# Patient Record
Sex: MALE | Race: BLACK OR AFRICAN AMERICAN | NOT HISPANIC OR LATINO | Employment: OTHER | URBAN - METROPOLITAN AREA
[De-identification: names, ages, dates, MRNs, and addresses within clinical notes are randomized per-mention and may not be internally consistent; named-entity substitution may affect disease eponyms.]

---

## 2019-11-12 ENCOUNTER — HOSPITAL ENCOUNTER (EMERGENCY)
Facility: HOSPITAL | Age: 54
Discharge: HOME | End: 2019-11-12
Attending: EMERGENCY MEDICINE
Payer: MEDICAID

## 2019-11-12 VITALS
TEMPERATURE: 99 F | OXYGEN SATURATION: 99 % | WEIGHT: 175 LBS | HEIGHT: 72 IN | BODY MASS INDEX: 23.7 KG/M2 | DIASTOLIC BLOOD PRESSURE: 64 MMHG | RESPIRATION RATE: 18 BRPM | HEART RATE: 69 BPM | SYSTOLIC BLOOD PRESSURE: 133 MMHG

## 2019-11-12 DIAGNOSIS — N39.0 URINARY TRACT INFECTION WITHOUT HEMATURIA, SITE UNSPECIFIED: Primary | ICD-10-CM

## 2019-11-12 DIAGNOSIS — E87.6 HYPOKALEMIA: ICD-10-CM

## 2019-11-12 DIAGNOSIS — R68.83 CHILLS: ICD-10-CM

## 2019-11-12 LAB
ALBUMIN SERPL BCP-MCNC: 3.3 G/DL (ref 3.5–5.2)
ALP SERPL-CCNC: 42 U/L (ref 55–135)
ALT SERPL W/O P-5'-P-CCNC: 19 U/L (ref 10–44)
ANION GAP SERPL CALC-SCNC: 12 MMOL/L (ref 8–16)
AST SERPL-CCNC: 14 U/L (ref 10–40)
BACTERIA #/AREA URNS HPF: ABNORMAL /HPF
BASOPHILS # BLD AUTO: 0.05 K/UL (ref 0–0.2)
BASOPHILS NFR BLD: 0.3 % (ref 0–1.9)
BILIRUB SERPL-MCNC: 1.4 MG/DL (ref 0.1–1)
BILIRUB UR QL STRIP: NEGATIVE
BUN SERPL-MCNC: 23 MG/DL (ref 6–20)
CALCIUM SERPL-MCNC: 8.9 MG/DL (ref 8.7–10.5)
CHLORIDE SERPL-SCNC: 97 MMOL/L (ref 95–110)
CLARITY UR: ABNORMAL
CO2 SERPL-SCNC: 29 MMOL/L (ref 23–29)
COLOR UR: YELLOW
CREAT SERPL-MCNC: 1.1 MG/DL (ref 0.5–1.4)
DIFFERENTIAL METHOD: ABNORMAL
EOSINOPHIL # BLD AUTO: 0 K/UL (ref 0–0.5)
EOSINOPHIL NFR BLD: 0.3 % (ref 0–8)
ERYTHROCYTE [DISTWIDTH] IN BLOOD BY AUTOMATED COUNT: 14.1 % (ref 11.5–14.5)
EST. GFR  (AFRICAN AMERICAN): >60 ML/MIN/1.73 M^2
EST. GFR  (NON AFRICAN AMERICAN): >60 ML/MIN/1.73 M^2
GLUCOSE SERPL-MCNC: 116 MG/DL (ref 70–110)
GLUCOSE UR QL STRIP: NEGATIVE
HCT VFR BLD AUTO: 34.8 % (ref 40–54)
HGB BLD-MCNC: 11.8 G/DL (ref 14–18)
HGB UR QL STRIP: ABNORMAL
HYALINE CASTS #/AREA URNS LPF: 58 /LPF
IMM GRANULOCYTES # BLD AUTO: 0.11 K/UL (ref 0–0.04)
IMM GRANULOCYTES NFR BLD AUTO: 0.7 % (ref 0–0.5)
KETONES UR QL STRIP: ABNORMAL
LEUKOCYTE ESTERASE UR QL STRIP: ABNORMAL
LYMPHOCYTES # BLD AUTO: 1.8 K/UL (ref 1–4.8)
LYMPHOCYTES NFR BLD: 12.3 % (ref 18–48)
MCH RBC QN AUTO: 30.9 PG (ref 27–31)
MCHC RBC AUTO-ENTMCNC: 33.9 G/DL (ref 32–36)
MCV RBC AUTO: 91 FL (ref 82–98)
MICROSCOPIC COMMENT: ABNORMAL
MONOCYTES # BLD AUTO: 1.5 K/UL (ref 0.3–1)
MONOCYTES NFR BLD: 10.2 % (ref 4–15)
NEUTROPHILS # BLD AUTO: 11.2 K/UL (ref 1.8–7.7)
NEUTROPHILS NFR BLD: 76.2 % (ref 38–73)
NITRITE UR QL STRIP: NEGATIVE
NRBC BLD-RTO: 0 /100 WBC
PH UR STRIP: 6 [PH] (ref 5–8)
PLATELET # BLD AUTO: 407 K/UL (ref 150–350)
PMV BLD AUTO: 10.4 FL (ref 9.2–12.9)
POTASSIUM SERPL-SCNC: 2.9 MMOL/L (ref 3.5–5.1)
PROT SERPL-MCNC: 8.4 G/DL (ref 6–8.4)
PROT UR QL STRIP: ABNORMAL
RBC # BLD AUTO: 3.82 M/UL (ref 4.6–6.2)
RBC #/AREA URNS HPF: 9 /HPF (ref 0–4)
SODIUM SERPL-SCNC: 138 MMOL/L (ref 136–145)
SP GR UR STRIP: 1.01 (ref 1–1.03)
SQUAMOUS #/AREA URNS HPF: 0 /HPF
URN SPEC COLLECT METH UR: ABNORMAL
UROBILINOGEN UR STRIP-ACNC: ABNORMAL EU/DL
WBC # BLD AUTO: 14.67 K/UL (ref 3.9–12.7)
WBC #/AREA URNS HPF: >100 /HPF (ref 0–5)

## 2019-11-12 PROCEDURE — 96375 TX/PRO/DX INJ NEW DRUG ADDON: CPT

## 2019-11-12 PROCEDURE — 93005 ELECTROCARDIOGRAM TRACING: CPT

## 2019-11-12 PROCEDURE — 81001 URINALYSIS AUTO W/SCOPE: CPT

## 2019-11-12 PROCEDURE — 96374 THER/PROPH/DIAG INJ IV PUSH: CPT

## 2019-11-12 PROCEDURE — 87086 URINE CULTURE/COLONY COUNT: CPT

## 2019-11-12 PROCEDURE — 25000003 PHARM REV CODE 250: Performed by: EMERGENCY MEDICINE

## 2019-11-12 PROCEDURE — 85025 COMPLETE CBC W/AUTO DIFF WBC: CPT

## 2019-11-12 PROCEDURE — 63600175 PHARM REV CODE 636 W HCPCS: Performed by: EMERGENCY MEDICINE

## 2019-11-12 PROCEDURE — 87186 SC STD MICRODIL/AGAR DIL: CPT

## 2019-11-12 PROCEDURE — 80053 COMPREHEN METABOLIC PANEL: CPT

## 2019-11-12 PROCEDURE — 36415 COLL VENOUS BLD VENIPUNCTURE: CPT

## 2019-11-12 PROCEDURE — 99285 EMERGENCY DEPT VISIT HI MDM: CPT | Mod: 25

## 2019-11-12 PROCEDURE — 87077 CULTURE AEROBIC IDENTIFY: CPT

## 2019-11-12 RX ORDER — IBUPROFEN 800 MG/1
800 TABLET ORAL 2 TIMES DAILY PRN
COMMUNITY
End: 2021-04-29 | Stop reason: SDUPTHER

## 2019-11-12 RX ORDER — POTASSIUM CHLORIDE 750 MG/1
40 TABLET, EXTENDED RELEASE ORAL DAILY
Qty: 10 TABLET | Refills: 0 | Status: SHIPPED | OUTPATIENT
Start: 2019-11-12 | End: 2021-04-29

## 2019-11-12 RX ORDER — CIPROFLOXACIN 500 MG/1
500 TABLET ORAL 2 TIMES DAILY
Qty: 20 TABLET | Refills: 0 | Status: SHIPPED | OUTPATIENT
Start: 2019-11-12 | End: 2019-11-22

## 2019-11-12 RX ORDER — CEFTRIAXONE 1 G/1
1 INJECTION, POWDER, FOR SOLUTION INTRAMUSCULAR; INTRAVENOUS
Status: DISCONTINUED | OUTPATIENT
Start: 2019-11-12 | End: 2019-11-12

## 2019-11-12 RX ORDER — CIPROFLOXACIN 500 MG/1
500 TABLET ORAL 2 TIMES DAILY
Qty: 20 TABLET | Refills: 0 | Status: SHIPPED | OUTPATIENT
Start: 2019-11-12 | End: 2019-11-12 | Stop reason: SDUPTHER

## 2019-11-12 RX ORDER — CEFTRIAXONE 1 G/1
1 INJECTION, POWDER, FOR SOLUTION INTRAMUSCULAR; INTRAVENOUS
Status: COMPLETED | OUTPATIENT
Start: 2019-11-12 | End: 2019-11-12

## 2019-11-12 RX ORDER — SIMVASTATIN 40 MG/1
40 TABLET, FILM COATED ORAL NIGHTLY
COMMUNITY
End: 2021-03-29

## 2019-11-12 RX ORDER — HYDROCHLOROTHIAZIDE 25 MG/1
25 TABLET ORAL DAILY
COMMUNITY
End: 2021-01-04

## 2019-11-12 RX ORDER — ONDANSETRON 2 MG/ML
4 INJECTION INTRAMUSCULAR; INTRAVENOUS
Status: COMPLETED | OUTPATIENT
Start: 2019-11-12 | End: 2019-11-12

## 2019-11-12 RX ORDER — ONDANSETRON 4 MG/1
4 TABLET, FILM COATED ORAL EVERY 6 HOURS
Qty: 12 TABLET | Refills: 0 | Status: SHIPPED | OUTPATIENT
Start: 2019-11-12 | End: 2019-11-12 | Stop reason: SDUPTHER

## 2019-11-12 RX ORDER — POTASSIUM CHLORIDE 20 MEQ/15ML
50 SOLUTION ORAL ONCE
Status: COMPLETED | OUTPATIENT
Start: 2019-11-12 | End: 2019-11-12

## 2019-11-12 RX ORDER — ONDANSETRON 4 MG/1
4 TABLET, FILM COATED ORAL EVERY 6 HOURS
Qty: 12 TABLET | Refills: 0 | Status: SHIPPED | OUTPATIENT
Start: 2019-11-12 | End: 2021-04-29

## 2019-11-12 RX ORDER — BACLOFEN 10 MG/1
10 TABLET ORAL 2 TIMES DAILY
COMMUNITY
End: 2021-01-04

## 2019-11-12 RX ADMIN — CEFTRIAXONE SODIUM 1 G: 1 INJECTION, POWDER, FOR SOLUTION INTRAMUSCULAR; INTRAVENOUS at 04:11

## 2019-11-12 RX ADMIN — POTASSIUM BICARBONATE 50 MEQ: 25 TABLET, EFFERVESCENT ORAL at 02:11

## 2019-11-12 RX ADMIN — ONDANSETRON 4 MG: 2 INJECTION INTRAMUSCULAR; INTRAVENOUS at 03:11

## 2019-11-12 RX ADMIN — POTASSIUM CHLORIDE 50 MEQ: 20 SOLUTION ORAL at 04:11

## 2019-11-12 NOTE — ED NOTES
Pt states he has the chills. He doesn't know if he had a fever. Also states he feels dehydrated. He has MS.

## 2019-11-12 NOTE — DISCHARGE INSTRUCTIONS
ContinueRoutine medication.  Encourage oral fluids.  Take antibiotics, Cipro, Zofran for nausea and and Zofran for nausea.  Watch for any fever over 100.8° and if any occurs return to the hospital.  Watch for any vomiting and if that persists despite taking Zofran, return to the hospital.  See her primary physician in 7-10 days.

## 2019-11-12 NOTE — ED PROVIDER NOTES
Encounter Date: 11/12/2019       History     Chief Complaint   Patient presents with    Chills     X 1 WEEK     54-year-old male who has a history of hypertension hyperlipidemia and multiple sclerosis, is brought to the emergency room by friends with history the patient has had subjective chills of 1 week duration.  No documented fever.  No sweats. Patient admits to some rhinitis but no complaint of any facial pain or sore throat. No headache. He does admit to some urinary frequency but denies any dysuria hematuria.  He has had some nausea and he states dry heaves but no actual vomiting or diarrhea.  The patient admits some urinary frequency but no complaints of any dysuria.        Review of patient's allergies indicates:  No Known Allergies  Past Medical History:   Diagnosis Date    Hyperlipidemia     Hypertension     Multiple sclerosis      No past surgical history on file.  No family history on file.  Social History     Tobacco Use    Smoking status: Not on file   Substance Use Topics    Alcohol use: Not on file    Drug use: Not on file     Review of Systems   Constitutional: Positive for chills. Negative for activity change, appetite change, diaphoresis and fever.   HENT: Positive for rhinorrhea. Negative for congestion, ear pain, sinus pain and sore throat.    Eyes: Negative for pain.   Respiratory: Negative for cough, chest tightness, shortness of breath, wheezing and stridor.    Cardiovascular: Negative for chest pain.   Gastrointestinal: Negative for abdominal pain, constipation, diarrhea, nausea and vomiting.   Genitourinary: Positive for frequency. Negative for dysuria, flank pain and hematuria.   Skin: Negative for pallor, rash and wound.   Neurological: Negative for syncope and headaches.   All other systems reviewed and are negative.      Physical Exam     Initial Vitals [11/12/19 1232]   BP Pulse Resp Temp SpO2   139/64 70 18 98.7 °F (37.1 °C) 100 %      MAP       --         Physical  Exam    Constitutional: He appears well-developed and well-nourished. He is not diaphoretic. No distress.   HENT:   Head: Normocephalic and atraumatic.   Nose: Nose normal.   Mouth/Throat: Oropharynx is clear and moist.   Rhinorrhea   Eyes: Conjunctivae and EOM are normal. Pupils are equal, round, and reactive to light. Right eye exhibits no discharge. Left eye exhibits no discharge. No scleral icterus.   Neck: Normal range of motion. Neck supple. No JVD present.   Cardiovascular: Normal rate, regular rhythm, normal heart sounds and intact distal pulses. Exam reveals no gallop and no friction rub.    No murmur heard.  Pulmonary/Chest: Breath sounds normal. No respiratory distress. He has no wheezes. He has no rhonchi. He has no rales.   Abdominal: Soft. Bowel sounds are normal. He exhibits no distension. There is no tenderness. There is no rebound and no guarding.   Musculoskeletal: Normal range of motion. He exhibits no edema or tenderness.   Lymphadenopathy:     He has no cervical adenopathy.   Neurological: He is alert and oriented to person, place, and time. He has normal strength. No cranial nerve deficit or sensory deficit. GCS score is 15. GCS eye subscore is 4. GCS verbal subscore is 5. GCS motor subscore is 6.   Skin: Skin is warm and dry. Capillary refill takes less than 2 seconds. No rash noted. No erythema. No pallor.   Psychiatric: He has a normal mood and affect. His behavior is normal. Judgment and thought content normal.         ED Course   Procedures  Labs Reviewed   CBC W/ AUTO DIFFERENTIAL   COMPREHENSIVE METABOLIC PANEL   URINALYSIS, REFLEX TO URINE CULTURE          Imaging Results    None                       Attending Attestation:             Attending ED Notes:   Diagnostic studies showed evidence of urinary tract infection.  CBC has an elevated white blood cell count.  All labs were reviewed.  During the ED course the patient was also noted to have had a potassium at 2.9 for which he  received 50 mEq of potassium orally the patient had an EKG showing a sinus bradycardia.  The patient will be given IV Rocephin and continued on antibiotics as an outpatient.  Potassium will also be continued in light of the fact he is on hydrochlorothiazide and that will predispose him to becoming hypokalemic.  Lastly he will also receive Zofran.  He is advised to check his temperature with a thermometer any for some reason he runs fever or has any worsening symptoms, he is to return to the emergency room.  The patient is to see his primary physician within the next 7-10 days.                        Clinical Impression:       ICD-10-CM ICD-9-CM   1. Urinary tract infection without hematuria, site unspecified N39.0 599.0   2. Chills R68.83 780.64   3. Hypokalemia E87.6 276.8                             Jonathan Camarillo Jr., MD  11/12/19 1540

## 2019-11-14 LAB — BACTERIA UR CULT: ABNORMAL

## 2019-11-15 ENCOUNTER — HOSPITAL ENCOUNTER (OUTPATIENT)
Facility: HOSPITAL | Age: 54
Discharge: HOME | End: 2019-11-17
Attending: EMERGENCY MEDICINE | Admitting: INTERNAL MEDICINE
Payer: MEDICAID

## 2019-11-15 DIAGNOSIS — R06.02 SHORTNESS OF BREATH: ICD-10-CM

## 2019-11-15 DIAGNOSIS — R06.6 INTRACTABLE HICCUPS: ICD-10-CM

## 2019-11-15 DIAGNOSIS — N17.9 ACUTE KIDNEY INJURY: Primary | ICD-10-CM

## 2019-11-15 DIAGNOSIS — R11.2 INTRACTABLE NAUSEA AND VOMITING: ICD-10-CM

## 2019-11-15 DIAGNOSIS — N30.00 ACUTE CYSTITIS WITHOUT HEMATURIA: ICD-10-CM

## 2019-11-15 DIAGNOSIS — E87.6 HYPOKALEMIA: ICD-10-CM

## 2019-11-15 DIAGNOSIS — N12 PYELONEPHRITIS: ICD-10-CM

## 2019-11-15 DIAGNOSIS — E86.0 DEHYDRATION: ICD-10-CM

## 2019-11-15 PROBLEM — N39.0 UTI (URINARY TRACT INFECTION): Status: ACTIVE | Noted: 2019-11-15

## 2019-11-15 LAB
ALBUMIN SERPL BCP-MCNC: 3.5 G/DL (ref 3.5–5.2)
ALP SERPL-CCNC: 41 U/L (ref 55–135)
ALT SERPL W/O P-5'-P-CCNC: 15 U/L (ref 10–44)
ANION GAP SERPL CALC-SCNC: 13 MMOL/L (ref 8–16)
AST SERPL-CCNC: 12 U/L (ref 10–40)
BASOPHILS # BLD AUTO: 0.04 K/UL (ref 0–0.2)
BASOPHILS NFR BLD: 0.3 % (ref 0–1.9)
BILIRUB SERPL-MCNC: 0.7 MG/DL (ref 0.1–1)
BILIRUB UR QL STRIP: NEGATIVE
BNP SERPL-MCNC: 50 PG/ML (ref 0–99)
BUN SERPL-MCNC: 25 MG/DL (ref 6–20)
CALCIUM SERPL-MCNC: 9.6 MG/DL (ref 8.7–10.5)
CHLORIDE SERPL-SCNC: 98 MMOL/L (ref 95–110)
CLARITY UR: CLEAR
CO2 SERPL-SCNC: 30 MMOL/L (ref 23–29)
COLOR UR: YELLOW
CREAT SERPL-MCNC: 1.9 MG/DL (ref 0.5–1.4)
DIFFERENTIAL METHOD: ABNORMAL
EOSINOPHIL # BLD AUTO: 0.1 K/UL (ref 0–0.5)
EOSINOPHIL NFR BLD: 0.7 % (ref 0–8)
ERYTHROCYTE [DISTWIDTH] IN BLOOD BY AUTOMATED COUNT: 14.4 % (ref 11.5–14.5)
EST. GFR  (AFRICAN AMERICAN): 44.9 ML/MIN/1.73 M^2
EST. GFR  (NON AFRICAN AMERICAN): 38.8 ML/MIN/1.73 M^2
GLUCOSE SERPL-MCNC: 126 MG/DL (ref 70–110)
GLUCOSE UR QL STRIP: NEGATIVE
HCT VFR BLD AUTO: 34.3 % (ref 40–54)
HGB BLD-MCNC: 11.3 G/DL (ref 14–18)
HGB UR QL STRIP: NEGATIVE
IMM GRANULOCYTES # BLD AUTO: 0.12 K/UL (ref 0–0.04)
IMM GRANULOCYTES NFR BLD AUTO: 0.8 % (ref 0–0.5)
INR PPP: 1.1
KETONES UR QL STRIP: NEGATIVE
LACTATE SERPL-SCNC: 1 MMOL/L (ref 0.5–1.9)
LEUKOCYTE ESTERASE UR QL STRIP: NEGATIVE
LIPASE SERPL-CCNC: 24 U/L (ref 4–60)
LYMPHOCYTES # BLD AUTO: 2.2 K/UL (ref 1–4.8)
LYMPHOCYTES NFR BLD: 14.6 % (ref 18–48)
MAGNESIUM SERPL-MCNC: 1.7 MG/DL (ref 1.6–2.6)
MCH RBC QN AUTO: 30.5 PG (ref 27–31)
MCHC RBC AUTO-ENTMCNC: 32.9 G/DL (ref 32–36)
MCV RBC AUTO: 93 FL (ref 82–98)
MONOCYTES # BLD AUTO: 1.6 K/UL (ref 0.3–1)
MONOCYTES NFR BLD: 10.3 % (ref 4–15)
NEUTROPHILS # BLD AUTO: 11.1 K/UL (ref 1.8–7.7)
NEUTROPHILS NFR BLD: 73.3 % (ref 38–73)
NITRITE UR QL STRIP: NEGATIVE
NRBC BLD-RTO: 0 /100 WBC
PH UR STRIP: 6 [PH] (ref 5–8)
PLATELET # BLD AUTO: 534 K/UL (ref 150–350)
PMV BLD AUTO: 9.9 FL (ref 9.2–12.9)
POTASSIUM SERPL-SCNC: 3.2 MMOL/L (ref 3.5–5.1)
PROT SERPL-MCNC: 8.2 G/DL (ref 6–8.4)
PROT UR QL STRIP: NEGATIVE
PROTHROMBIN TIME: 13.8 SEC (ref 10.6–14.8)
RBC # BLD AUTO: 3.71 M/UL (ref 4.6–6.2)
SODIUM SERPL-SCNC: 141 MMOL/L (ref 136–145)
SP GR UR STRIP: 1.01 (ref 1–1.03)
TROPONIN I SERPL DL<=0.01 NG/ML-MCNC: <0.03 NG/ML (ref 0.02–0.04)
URN SPEC COLLECT METH UR: NORMAL
UROBILINOGEN UR STRIP-ACNC: NEGATIVE EU/DL
WBC # BLD AUTO: 15.09 K/UL (ref 3.9–12.7)

## 2019-11-15 PROCEDURE — 93005 ELECTROCARDIOGRAM TRACING: CPT

## 2019-11-15 PROCEDURE — 83605 ASSAY OF LACTIC ACID: CPT

## 2019-11-15 PROCEDURE — 25000003 PHARM REV CODE 250: Performed by: EMERGENCY MEDICINE

## 2019-11-15 PROCEDURE — 84484 ASSAY OF TROPONIN QUANT: CPT

## 2019-11-15 PROCEDURE — G0378 HOSPITAL OBSERVATION PER HR: HCPCS

## 2019-11-15 PROCEDURE — 80307 DRUG TEST PRSMV CHEM ANLYZR: CPT

## 2019-11-15 PROCEDURE — 96365 THER/PROPH/DIAG IV INF INIT: CPT

## 2019-11-15 PROCEDURE — 81003 URINALYSIS AUTO W/O SCOPE: CPT

## 2019-11-15 PROCEDURE — 83735 ASSAY OF MAGNESIUM: CPT

## 2019-11-15 PROCEDURE — 96375 TX/PRO/DX INJ NEW DRUG ADDON: CPT

## 2019-11-15 PROCEDURE — 83690 ASSAY OF LIPASE: CPT

## 2019-11-15 PROCEDURE — 87086 URINE CULTURE/COLONY COUNT: CPT

## 2019-11-15 PROCEDURE — 80053 COMPREHEN METABOLIC PANEL: CPT

## 2019-11-15 PROCEDURE — 96376 TX/PRO/DX INJ SAME DRUG ADON: CPT

## 2019-11-15 PROCEDURE — 25000003 PHARM REV CODE 250: Performed by: NURSE PRACTITIONER

## 2019-11-15 PROCEDURE — 85610 PROTHROMBIN TIME: CPT

## 2019-11-15 PROCEDURE — 63600175 PHARM REV CODE 636 W HCPCS: Performed by: NURSE PRACTITIONER

## 2019-11-15 PROCEDURE — 36415 COLL VENOUS BLD VENIPUNCTURE: CPT

## 2019-11-15 PROCEDURE — 87040 BLOOD CULTURE FOR BACTERIA: CPT | Mod: 59

## 2019-11-15 PROCEDURE — 83880 ASSAY OF NATRIURETIC PEPTIDE: CPT

## 2019-11-15 PROCEDURE — 96372 THER/PROPH/DIAG INJ SC/IM: CPT | Mod: 59

## 2019-11-15 PROCEDURE — 96361 HYDRATE IV INFUSION ADD-ON: CPT

## 2019-11-15 PROCEDURE — 99285 EMERGENCY DEPT VISIT HI MDM: CPT | Mod: 25

## 2019-11-15 PROCEDURE — 80320 DRUG SCREEN QUANTALCOHOLS: CPT

## 2019-11-15 PROCEDURE — 85025 COMPLETE CBC W/AUTO DIFF WBC: CPT

## 2019-11-15 PROCEDURE — 63600175 PHARM REV CODE 636 W HCPCS: Performed by: EMERGENCY MEDICINE

## 2019-11-15 RX ORDER — CHLORPROMAZINE HYDROCHLORIDE 25 MG/1
25 TABLET, FILM COATED ORAL 3 TIMES DAILY
Status: DISCONTINUED | OUTPATIENT
Start: 2019-11-15 | End: 2019-11-15

## 2019-11-15 RX ORDER — POTASSIUM CHLORIDE 20 MEQ/15ML
20 SOLUTION ORAL ONCE
Status: DISCONTINUED | OUTPATIENT
Start: 2019-11-15 | End: 2019-11-15

## 2019-11-15 RX ORDER — ONDANSETRON 2 MG/ML
4 INJECTION INTRAMUSCULAR; INTRAVENOUS
Status: COMPLETED | OUTPATIENT
Start: 2019-11-15 | End: 2019-11-15

## 2019-11-15 RX ORDER — CHLORPROMAZINE HYDROCHLORIDE 25 MG/1
25 TABLET, FILM COATED ORAL 3 TIMES DAILY PRN
Status: DISCONTINUED | OUTPATIENT
Start: 2019-11-15 | End: 2019-11-17 | Stop reason: HOSPADM

## 2019-11-15 RX ORDER — ENOXAPARIN SODIUM 100 MG/ML
40 INJECTION SUBCUTANEOUS EVERY 24 HOURS
Status: DISCONTINUED | OUTPATIENT
Start: 2019-11-15 | End: 2019-11-17 | Stop reason: HOSPADM

## 2019-11-15 RX ORDER — BACLOFEN 10 MG/1
10 TABLET ORAL 2 TIMES DAILY
Status: DISCONTINUED | OUTPATIENT
Start: 2019-11-15 | End: 2019-11-17 | Stop reason: HOSPADM

## 2019-11-15 RX ORDER — ONDANSETRON 2 MG/ML
4 INJECTION INTRAMUSCULAR; INTRAVENOUS EVERY 6 HOURS PRN
Status: DISCONTINUED | OUTPATIENT
Start: 2019-11-15 | End: 2019-11-17 | Stop reason: HOSPADM

## 2019-11-15 RX ORDER — SODIUM CHLORIDE 0.9 % (FLUSH) 0.9 %
10 SYRINGE (ML) INJECTION
Status: DISCONTINUED | OUTPATIENT
Start: 2019-11-15 | End: 2019-11-17 | Stop reason: HOSPADM

## 2019-11-15 RX ORDER — SODIUM CHLORIDE 9 MG/ML
INJECTION, SOLUTION INTRAVENOUS CONTINUOUS
Status: DISCONTINUED | OUTPATIENT
Start: 2019-11-15 | End: 2019-11-17 | Stop reason: HOSPADM

## 2019-11-15 RX ORDER — SIMVASTATIN 40 MG/1
40 TABLET, FILM COATED ORAL NIGHTLY
Status: DISCONTINUED | OUTPATIENT
Start: 2019-11-15 | End: 2019-11-17 | Stop reason: HOSPADM

## 2019-11-15 RX ADMIN — POTASSIUM BICARBONATE 50 MEQ: 25 TABLET, EFFERVESCENT ORAL at 05:11

## 2019-11-15 RX ADMIN — CHLORPROMAZINE HYDROCHLORIDE 25 MG: 25 TABLET, SUGAR COATED ORAL at 04:11

## 2019-11-15 RX ADMIN — ENOXAPARIN SODIUM 40 MG: 100 INJECTION SUBCUTANEOUS at 11:11

## 2019-11-15 RX ADMIN — ONDANSETRON 4 MG: 2 INJECTION INTRAMUSCULAR; INTRAVENOUS at 04:11

## 2019-11-15 RX ADMIN — SODIUM CHLORIDE, SODIUM LACTATE, POTASSIUM CHLORIDE, AND CALCIUM CHLORIDE 1000 ML: .6; .31; .03; .02 INJECTION, SOLUTION INTRAVENOUS at 04:11

## 2019-11-15 RX ADMIN — SIMVASTATIN 40 MG: 40 TABLET, FILM COATED ORAL at 11:11

## 2019-11-15 RX ADMIN — ONDANSETRON 4 MG: 2 INJECTION INTRAMUSCULAR; INTRAVENOUS at 06:11

## 2019-11-15 RX ADMIN — BACLOFEN 10 MG: 10 TABLET ORAL at 11:11

## 2019-11-15 RX ADMIN — SODIUM CHLORIDE, SODIUM LACTATE, POTASSIUM CHLORIDE, AND CALCIUM CHLORIDE 1000 ML: .6; .31; .03; .02 INJECTION, SOLUTION INTRAVENOUS at 05:11

## 2019-11-15 RX ADMIN — CEFTRIAXONE 1 G: 1 INJECTION, SOLUTION INTRAVENOUS at 11:11

## 2019-11-15 RX ADMIN — SODIUM CHLORIDE: 0.9 INJECTION, SOLUTION INTRAVENOUS at 11:11

## 2019-11-15 NOTE — ED PROVIDER NOTES
Encounter Date: 11/15/2019       History     Chief Complaint   Patient presents with    Fever     54-year-old male presented emergency department saying he needs to be admitted to the hospital as he is not feeling any better.  Patient complains of having intractable hiccups and he states he is still feeling weak and still having nausea and vomiting.  Patient was seen here 3 days ago.  Patient denies chest pain. Patient states for the past week he was having fever and chills on and off.  Patient had dysuria.  Denies abdominal pain or chest pain. Denies any focal weakness or numbness.  Patient has history of multiple sclerosis.        Review of patient's allergies indicates:  No Known Allergies  Past Medical History:   Diagnosis Date    Hyperlipidemia     Hypertension     Multiple sclerosis      No past surgical history on file.  No family history on file.  Social History     Tobacco Use    Smoking status: Not on file   Substance Use Topics    Alcohol use: Not on file    Drug use: Not on file     Review of Systems   Constitutional: Positive for activity change, appetite change, chills, fatigue and fever.   HENT: Negative.    Eyes: Negative.    Respiratory: Positive for shortness of breath.    Cardiovascular: Negative.  Negative for chest pain.   Gastrointestinal: Positive for nausea and vomiting.   Endocrine: Negative.    Genitourinary: Negative.    Musculoskeletal: Negative.    Skin: Negative.    Allergic/Immunologic: Negative.    Neurological: Negative.    Hematological: Negative.    Psychiatric/Behavioral: Negative.    All other systems reviewed and are negative.      Physical Exam     Initial Vitals [11/15/19 1150]   BP Pulse Resp Temp SpO2   136/76 73 20 98.7 °F (37.1 °C) 99 %      MAP       --         Physical Exam    Nursing note and vitals reviewed.  Constitutional: He appears well-developed and well-nourished.   Distress secondary to intractable hiccups and nausea   HENT:   Head: Normocephalic and  atraumatic.   Nose: Nose normal.   Mouth/Throat: Oropharynx is clear and moist.   Eyes: Conjunctivae and EOM are normal.   Neck: Normal range of motion. No tracheal deviation present. No JVD present.   Cardiovascular: Normal rate, regular rhythm, normal heart sounds and intact distal pulses. Exam reveals no friction rub.    No murmur heard.  Pulmonary/Chest: Breath sounds normal. No stridor. No respiratory distress. He has no wheezes. He has no rales.   Abdominal: Soft. He exhibits no distension. There is no tenderness. There is no rebound and no guarding.   Musculoskeletal: Normal range of motion.   Neurological: He is alert and oriented to person, place, and time. He has normal strength. He displays normal reflexes. No cranial nerve deficit or sensory deficit. GCS score is 15. GCS eye subscore is 4. GCS verbal subscore is 5. GCS motor subscore is 6.   Skin: Skin is warm and dry. Capillary refill takes less than 2 seconds. No erythema.   Psychiatric: He has a normal mood and affect. Thought content normal.         ED Course   Procedures  Labs Reviewed   CBC W/ AUTO DIFFERENTIAL - Abnormal; Notable for the following components:       Result Value    WBC 15.09 (*)     RBC 3.71 (*)     Hemoglobin 11.3 (*)     Hematocrit 34.3 (*)     Platelets 534 (*)     Immature Granulocytes 0.8 (*)     Gran # (ANC) 11.1 (*)     Immature Grans (Abs) 0.12 (*)     Mono # 1.6 (*)     Gran% 73.3 (*)     Lymph% 14.6 (*)     All other components within normal limits   COMPREHENSIVE METABOLIC PANEL - Abnormal; Notable for the following components:    Potassium 3.2 (*)     CO2 30 (*)     Glucose 126 (*)     BUN, Bld 25 (*)     Creatinine 1.9 (*)     Alkaline Phosphatase 41 (*)     eGFR if  44.9 (*)     eGFR if non  38.8 (*)     All other components within normal limits   CULTURE, BLOOD   CULTURE, BLOOD   URINALYSIS, REFLEX TO URINE CULTURE   DRUG SCREEN PANEL, URINE EMERGENCY   TROPONIN I   B-TYPE NATRIURETIC  PEPTIDE   PROTIME-INR   MAGNESIUM   LACTIC ACID, PLASMA   INFLUENZA A AND B ANTIGEN   LACTIC ACID, PLASMA          Imaging Results          X-Ray Chest AP Portable (In process)                  Medical Decision Making:   Differential Diagnosis:   54-year-old male presented emergency department with worsening symptoms compared to recent visit.  Patient saying he is still having nausea and vomiting and feeling weak and also complains of having intractable hiccups.  Patient does have evidence of dehydration with acute kidney injury. Patient is currently being treated for urinary tract infection.  Given patient's intractable symptoms and failure of outpatient treatment Hospital Medicine consulted for evaluation for further management.  Patient saying he feels weak and wants to be admitted to the hospital last not improving.  Screening labs reviewed.  Chest x-ray unremarkable.  Hospital Medicine evaluating the patient for further management.  Leukocytosis and elevated creatinine noted.  Clinical Tests:   Lab Tests: Reviewed  Radiological Study: Reviewed  Medical Tests: Reviewed                   ED Course as of Nov 15 1607   Fri Nov 15, 2019   1153 Seen on November 12th for similar complaints. Discharged home with diagnosis urinary tract infection, hypokalemia.    Continues to complain of fever chills.  Stable vital signs in triage.  Afebrile.    [BF]      ED Course User Index  [BF] CHYNA Booth                Clinical Impression:       ICD-10-CM ICD-9-CM   1. Shortness of breath R06.02 786.05     Dehydration  Acute kidney injury  Intractable nausea  Intractable hiccups                        Fanta Osei MD  11/15/19 6396

## 2019-11-16 PROBLEM — N12 PYELONEPHRITIS: Status: ACTIVE | Noted: 2019-11-15

## 2019-11-16 PROBLEM — E83.42 HYPOMAGNESEMIA: Status: ACTIVE | Noted: 2019-11-16

## 2019-11-16 LAB
ALBUMIN SERPL BCP-MCNC: 2.8 G/DL (ref 3.5–5.2)
ALP SERPL-CCNC: 34 U/L (ref 55–135)
ALT SERPL W/O P-5'-P-CCNC: 11 U/L (ref 10–44)
AMPHET+METHAMPHET UR QL: NEGATIVE
ANION GAP SERPL CALC-SCNC: 10 MMOL/L (ref 8–16)
AST SERPL-CCNC: 13 U/L (ref 10–40)
BARBITURATES UR QL SCN>200 NG/ML: NEGATIVE
BASOPHILS # BLD AUTO: 0.04 K/UL (ref 0–0.2)
BASOPHILS NFR BLD: 0.4 % (ref 0–1.9)
BENZODIAZ UR QL SCN>200 NG/ML: NEGATIVE
BILIRUB SERPL-MCNC: 1 MG/DL (ref 0.1–1)
BUN SERPL-MCNC: 19 MG/DL (ref 6–20)
BZE UR QL SCN: NEGATIVE
CALCIUM SERPL-MCNC: 8.5 MG/DL (ref 8.7–10.5)
CANNABINOIDS UR QL SCN: NORMAL
CHLORIDE SERPL-SCNC: 101 MMOL/L (ref 95–110)
CO2 SERPL-SCNC: 29 MMOL/L (ref 23–29)
CREAT SERPL-MCNC: 1.3 MG/DL (ref 0.5–1.4)
CREAT UR-MCNC: 88 MG/DL (ref 23–375)
DIFFERENTIAL METHOD: ABNORMAL
EOSINOPHIL # BLD AUTO: 0.1 K/UL (ref 0–0.5)
EOSINOPHIL NFR BLD: 1.3 % (ref 0–8)
ERYTHROCYTE [DISTWIDTH] IN BLOOD BY AUTOMATED COUNT: 14.1 % (ref 11.5–14.5)
EST. GFR  (AFRICAN AMERICAN): >60 ML/MIN/1.73 M^2
EST. GFR  (NON AFRICAN AMERICAN): >60 ML/MIN/1.73 M^2
ETHANOL SERPL-MCNC: <5 MG/DL
GLUCOSE SERPL-MCNC: 109 MG/DL (ref 70–110)
HCT VFR BLD AUTO: 28.7 % (ref 40–54)
HGB BLD-MCNC: 9.7 G/DL (ref 14–18)
IMM GRANULOCYTES # BLD AUTO: 0.06 K/UL (ref 0–0.04)
IMM GRANULOCYTES NFR BLD AUTO: 0.6 % (ref 0–0.5)
LYMPHOCYTES # BLD AUTO: 2.3 K/UL (ref 1–4.8)
LYMPHOCYTES NFR BLD: 24.3 % (ref 18–48)
MAGNESIUM SERPL-MCNC: 1.4 MG/DL (ref 1.6–2.6)
MCH RBC QN AUTO: 30.7 PG (ref 27–31)
MCHC RBC AUTO-ENTMCNC: 33.8 G/DL (ref 32–36)
MCV RBC AUTO: 91 FL (ref 82–98)
MONOCYTES # BLD AUTO: 0.8 K/UL (ref 0.3–1)
MONOCYTES NFR BLD: 8.6 % (ref 4–15)
NEUTROPHILS # BLD AUTO: 6.1 K/UL (ref 1.8–7.7)
NEUTROPHILS NFR BLD: 64.8 % (ref 38–73)
NRBC BLD-RTO: 0 /100 WBC
OPIATES UR QL SCN: NEGATIVE
PCP UR QL SCN>25 NG/ML: NEGATIVE
PLATELET # BLD AUTO: 428 K/UL (ref 150–350)
PMV BLD AUTO: 10 FL (ref 9.2–12.9)
POTASSIUM SERPL-SCNC: 2.8 MMOL/L (ref 3.5–5.1)
PROT SERPL-MCNC: 6.4 G/DL (ref 6–8.4)
RBC # BLD AUTO: 3.16 M/UL (ref 4.6–6.2)
SODIUM SERPL-SCNC: 140 MMOL/L (ref 136–145)
TOXICOLOGY INFORMATION: NORMAL
WBC # BLD AUTO: 9.38 K/UL (ref 3.9–12.7)

## 2019-11-16 PROCEDURE — 63600175 PHARM REV CODE 636 W HCPCS: Performed by: NURSE PRACTITIONER

## 2019-11-16 PROCEDURE — 96375 TX/PRO/DX INJ NEW DRUG ADDON: CPT

## 2019-11-16 PROCEDURE — 25000003 PHARM REV CODE 250: Performed by: NURSE PRACTITIONER

## 2019-11-16 PROCEDURE — 83735 ASSAY OF MAGNESIUM: CPT

## 2019-11-16 PROCEDURE — 96372 THER/PROPH/DIAG INJ SC/IM: CPT | Mod: 59

## 2019-11-16 PROCEDURE — 36415 COLL VENOUS BLD VENIPUNCTURE: CPT

## 2019-11-16 PROCEDURE — 85025 COMPLETE CBC W/AUTO DIFF WBC: CPT

## 2019-11-16 PROCEDURE — 96376 TX/PRO/DX INJ SAME DRUG ADON: CPT

## 2019-11-16 PROCEDURE — 80053 COMPREHEN METABOLIC PANEL: CPT

## 2019-11-16 PROCEDURE — 63600175 PHARM REV CODE 636 W HCPCS: Performed by: INTERNAL MEDICINE

## 2019-11-16 PROCEDURE — 25000003 PHARM REV CODE 250: Performed by: INTERNAL MEDICINE

## 2019-11-16 PROCEDURE — G0378 HOSPITAL OBSERVATION PER HR: HCPCS

## 2019-11-16 RX ORDER — LANOLIN ALCOHOL/MO/W.PET/CERES
800 CREAM (GRAM) TOPICAL EVERY 4 HOURS PRN
Status: DISCONTINUED | OUTPATIENT
Start: 2019-11-16 | End: 2019-11-17 | Stop reason: HOSPADM

## 2019-11-16 RX ORDER — LANOLIN ALCOHOL/MO/W.PET/CERES
400 CREAM (GRAM) TOPICAL EVERY 4 HOURS PRN
Status: DISCONTINUED | OUTPATIENT
Start: 2019-11-16 | End: 2019-11-17 | Stop reason: HOSPADM

## 2019-11-16 RX ORDER — SODIUM,POTASSIUM PHOSPHATES 280-250MG
1 POWDER IN PACKET (EA) ORAL EVERY 4 HOURS PRN
Status: DISCONTINUED | OUTPATIENT
Start: 2019-11-16 | End: 2019-11-17 | Stop reason: HOSPADM

## 2019-11-16 RX ORDER — SODIUM,POTASSIUM PHOSPHATES 280-250MG
2 POWDER IN PACKET (EA) ORAL EVERY 4 HOURS PRN
Status: DISCONTINUED | OUTPATIENT
Start: 2019-11-16 | End: 2019-11-17 | Stop reason: HOSPADM

## 2019-11-16 RX ORDER — MAGNESIUM SULFATE HEPTAHYDRATE 40 MG/ML
2 INJECTION, SOLUTION INTRAVENOUS
Status: DISCONTINUED | OUTPATIENT
Start: 2019-11-16 | End: 2019-11-17 | Stop reason: HOSPADM

## 2019-11-16 RX ORDER — MAGNESIUM SULFATE HEPTAHYDRATE 40 MG/ML
2 INJECTION, SOLUTION INTRAVENOUS ONCE
Status: COMPLETED | OUTPATIENT
Start: 2019-11-16 | End: 2019-11-16

## 2019-11-16 RX ORDER — POTASSIUM CHLORIDE 20 MEQ/1
20 TABLET, EXTENDED RELEASE ORAL
Status: DISCONTINUED | OUTPATIENT
Start: 2019-11-16 | End: 2019-11-17 | Stop reason: HOSPADM

## 2019-11-16 RX ORDER — POTASSIUM CHLORIDE 20 MEQ/1
40 TABLET, EXTENDED RELEASE ORAL
Status: DISPENSED | OUTPATIENT
Start: 2019-11-16 | End: 2019-11-16

## 2019-11-16 RX ADMIN — BACLOFEN 10 MG: 10 TABLET ORAL at 08:11

## 2019-11-16 RX ADMIN — CEFTRIAXONE 1 G: 1 INJECTION, SOLUTION INTRAVENOUS at 08:11

## 2019-11-16 RX ADMIN — POTASSIUM CHLORIDE 40 MEQ: 20 TABLET, EXTENDED RELEASE ORAL at 05:11

## 2019-11-16 RX ADMIN — ENOXAPARIN SODIUM 40 MG: 100 INJECTION SUBCUTANEOUS at 05:11

## 2019-11-16 RX ADMIN — MAGNESIUM SULFATE 2 G: 2 INJECTION INTRAVENOUS at 05:11

## 2019-11-16 RX ADMIN — SIMVASTATIN 40 MG: 40 TABLET, FILM COATED ORAL at 08:11

## 2019-11-16 NOTE — ED NOTES
Report received from DONNA Olmedo. Assume care of pt. Pt resting comfortably and independently repositioned in stretcher with bed locked in lowest position for safety. NAD noted at this time. Respirations even and unlabored and visible chest rise noted.  Patient offered bathroom assistance and denies need at this time. Pt instructed to call if assistance is needed. Pt on continuous cardiac, BP, and O2 monitoring. Call light within reach. Family at bedside. No needs at this time. Will continue to monitor.

## 2019-11-16 NOTE — ASSESSMENT & PLAN NOTE
Patient treated for UTI a few days ago. Culture +E. Coli; Patient states he has been unable to take PO antibiotics. Reports dysuria has resolved.   Follow up urine culture  Continue IV rocephin

## 2019-11-16 NOTE — CONSULTS
[facility]  Adult Nutrition  Consult Note    SUMMARY     Recommendations    1. RD added ensure to diet order.  2. If pt not albe to tolerate po intake, pt may benefit from nutrition therapy.     Goals: 1. Pt to consume >75% of assessed nutritional needs via po intake.   Nutrition Goal Status: new    Reason for Assessment    Reason For Assessment: consult  Diagnosis: (dehydration, intractable hiccups)  Relevant Medical History: multiple sclerosis, htn, hyperlipidemia  Interdisciplinary Rounds: did not attend    Dietitian Rounds Brief    Pt c/o having hiccups for past 2 weeks and hasn't been able to eat very much during that time, has had nausea and a poor appetite. He reports losing about 20#s over 2 weeks. He also reports that his wt was 178, and is now 158, when actually we have him as 138#.     Nutrition Risk Screen    Nutrition Risk Screen: other (see comments)(weight loss)    Nutrition/Diet History    Spiritual, Cultural Beliefs, Yazdanism Practices, Values that Affect Care: yes    Anthropometrics    Temp: 98.6 °F (37 °C)  Height Method: Stated  Height: 6' (182.9 cm)  Height (inches): 72 in  Weight Method: Bed Scale  Weight: 60.8 kg (134 lb)  Weight (lb): 134 lb  Ideal Body Weight (IBW), Male: 178 lb  % Ideal Body Weight, Male (lb): 75.28 lb  % Ideal Body Weight Malnutrition: 70-79%: moderate deficit  BMI (Calculated): 18.2  BMI Grade: 17 - 18.4 protein-energy malnutrition grade I  Weight Loss: unintentional  Usual Body Weight (UBW), k kg  Weight Change Amount: 44 lb 1.5 oz  % Usual Body Weight: 75.2       Lab/Procedures/Meds    Pertinent Labs Reviewed: reviewed  Clinical Chemistry:  Recent Labs   Lab 11/15/19  1225 19  0524    140   K 3.2* 2.8*   CL 98 101   CO2 30* 29   * 109   BUN 25* 19   CREATININE 1.9* 1.3   CALCIUM 9.6 8.5*   PROT 8.2 6.4   ALBUMIN 3.5 2.8*   BILITOT 0.7 1.0   ALKPHOS 41* 34*   AST 12 13   ALT 15 11   ANIONGAP 13 10   ESTGFRAFRICA 44.9* >60.0    EGFRNONAA 38.8* >60.0   MG 1.7 1.4*   LIPASE 24  --      CBC:   Recent Labs   Lab 11/16/19  0524   WBC 9.38   RBC 3.16*   HGB 9.7*   HCT 28.7*   *   MCV 91   MCH 30.7   MCHC 33.8     Lipid Panel:  No results for input(s): CHOL, HDL, LDLCALC, TRIG, CHOLHDL in the last 168 hours.  Cardiac Profile:  Recent Labs   Lab 11/15/19  1225   BNP 50   TROPONINI <0.030       Pertinent Medications Reviewed: reviewed  Scheduled Meds:   baclofen  10 mg Oral BID    cefTRIAXone (ROCEPHIN) IVPB  1 g Intravenous Q24H    enoxaparin  40 mg Subcutaneous Daily    simvastatin  40 mg Oral QHS     Continuous Infusions:   sodium chloride 0.9% 100 mL/hr at 11/15/19 2335     PRN Meds:.chlorproMAZINE, ondansetron, sodium chloride 0.9%      Estimated/Assessed Needs    Weight Used For Calorie Calculations: 60.8 kg (134 lb 0.6 oz)  Energy Calorie Requirements (kcal): 1637-7248  Energy Need Method: Kcal/kg  Protein Requirements:   Weight Used For Protein Calculations: 60.8 kg (134 lb 0.6 oz)     Estimated Fluid Requirement Method: RDA Method  RDA Method (mL): 2128         Nutrition Prescription Ordered    Current Diet Order: regular  Oral Nutrition Supplement: RD added Ensure    Evaluation of Received Nutrient/Fluid Intake    Tolerance: not tolerating      Nutrition Risk    Level of Risk/Frequency of Follow-up: high          Monitor and Evaluation    Food and Nutrient Intake: food and beverage intake  Food and Nutrient Adminstration: diet order       Nutrition Follow-Up     yes  Adelaida Hayes RD 11/16/2019 1:47 PM

## 2019-11-16 NOTE — HPI
Mr. Wilks is a 54 year old male who presents to the ED with the complaint of intractable hiccups, weakness, nausea, and vomiting. The patient reports subjective fevers and chills for the past week. He was seen in the ED here at [facility] on 11/12/19 and treated for UTI. He was given IV Rocephin and prescribed Cipro to take home. Urine culture from that time +E.coli. The patient reports that since he was discharged home he has been having intractable nausea and vomiting. He now continues to have dry heaving and hiccups. He states the last thing he ate was a hot dog 2 days ago. He also reports that he continues to have subjective fevers. He states that dysuria has resolved. In the ED lab work shows WBC 15, creat increase from 1.1 to 1.9, potassium 3.2. CXR with no acute etiology. UA and culture pending. The patient is treated with IVF and multiple doses of antiemetics. He will be admitted for further evaluation and treatment.

## 2019-11-16 NOTE — H&P
formerly [facility] Medicine  History & Physical    Patient Name: Yuriy Wilks  MRN: [id number]  Admission Date: 11/15/2019  Attending Physician: Bob Roque MD   Primary Care Provider: Jacob Osullivan MD         Patient information was obtained from patient and ER records.     Subjective:     Principal Problem:Dehydration    Chief Complaint:   Chief Complaint   Patient presents with    Fever        HPI: Mr. Wilks is a 54 year old male who presents to the ED with the complaint of intractable hiccups, weakness, nausea, and vomiting. The patient reports subjective fevers and chills for the past week. He was seen in the ED here at [facility] on 11/12/19 and treated for UTI. He was given IV Rocephin and prescribed Cipro to take home. Urine culture from that time +E.coli. The patient reports that since he was discharged home he has been having intractable nausea and vomiting. He now continues to have dry heaving and hiccups. He states the last thing he ate was a hot dog 2 days ago. He also reports that he continues to have subjective fevers. He states that dysuria has resolved. In the ED lab work shows WBC 15, creat increase from 1.1 to 1.9, potassium 3.2. CXR with no acute etiology. UA and culture pending. The patient is treated with IVF and multiple doses of antiemetics. He will be admitted for further evaluation and treatment.     Past Medical History:   Diagnosis Date    Hyperlipidemia     Hypertension     Multiple sclerosis        No past surgical history on file.    Review of patient's allergies indicates:  No Known Allergies    No current facility-administered medications on file prior to encounter.      Current Outpatient Medications on File Prior to Encounter   Medication Sig    baclofen (LIORESAL) 10 MG tablet Take 10 mg by mouth 2 (two) times daily.    ciprofloxacin HCl (CIPRO) 500 MG tablet Take 1 tablet (500 mg total) by mouth 2 (two) times daily. for 10 days    dimethyl fumarate  (TECFIDERA ORAL) Take 240 mg by mouth 2 (two) times daily.    hydroCHLOROthiazide (HYDRODIURIL) 25 MG tablet Take 25 mg by mouth once daily.    ibuprofen (ADVIL,MOTRIN) 800 MG tablet Take 800 mg by mouth 2 (two) times daily as needed.     simvastatin (ZOCOR) 40 MG tablet Take 40 mg by mouth every evening.    ondansetron (ZOFRAN) 4 MG tablet Take 1 tablet (4 mg total) by mouth every 6 (six) hours.    potassium chloride (KLOR-CON) 10 MEQ TbSR Take 4 tablets (40 mEq total) by mouth once daily.     Family History     None        Tobacco Use    Smoking status: Not on file   Substance and Sexual Activity    Alcohol use: Not on file    Drug use: Not on file    Sexual activity: Not on file     Review of Systems   Constitutional: Positive for appetite change, chills and fever. Negative for diaphoresis and fatigue.   HENT: Negative for congestion, ear pain, nosebleeds, rhinorrhea, sore throat and voice change.    Eyes: Negative for visual disturbance.   Respiratory: Negative for cough, choking, chest tightness, shortness of breath and wheezing.    Cardiovascular: Negative for chest pain, palpitations and leg swelling.   Gastrointestinal: Positive for abdominal pain, nausea and vomiting. Negative for abdominal distention, blood in stool, constipation and diarrhea.   Genitourinary: Negative for difficulty urinating, dysuria, flank pain, frequency and hematuria.   Musculoskeletal: Negative for gait problem, joint swelling, myalgias, neck pain and neck stiffness.   Skin: Negative for rash and wound.   Neurological: Positive for weakness. Negative for dizziness, tremors, seizures, syncope, facial asymmetry, speech difficulty, light-headedness, numbness and headaches.   Psychiatric/Behavioral: Negative for confusion and hallucinations.     Objective:     Vital Signs (Most Recent):  Temp: 98.7 °F (37.1 °C) (11/15/19 1150)  Pulse: 71 (11/15/19 2000)  Resp: 20 (11/15/19 1150)  BP: 106/62 (11/15/19 2000)  SpO2: 99 % (11/15/19  2000) Vital Signs (24h Range):  Temp:  [98.7 °F (37.1 °C)] 98.7 °F (37.1 °C)  Pulse:  [] 71  Resp:  [20] 20  SpO2:  [99 %-100 %] 99 %  BP: (104-136)/(56-76) 106/62     Weight: 69.9 kg (154 lb)  Body mass index is 20.89 kg/m².    Physical Exam   Constitutional: He is oriented to person, place, and time. He appears well-developed and well-nourished. He appears lethargic. He appears ill.   HENT:   Head: Normocephalic and atraumatic.   Eyes: Pupils are equal, round, and reactive to light. EOM are normal.   Neck: Normal range of motion. Neck supple.   Cardiovascular: Normal rate, regular rhythm, normal heart sounds and intact distal pulses.   Pulmonary/Chest: Effort normal and breath sounds normal.   Abdominal: Soft. Bowel sounds are normal. There is no tenderness.   Intractable hiccups and nausea   Musculoskeletal: Normal range of motion.   Neurological: He is oriented to person, place, and time. He appears lethargic. GCS eye subscore is 4. GCS verbal subscore is 5. GCS motor subscore is 6.   Generalized weakness. History of MS   Skin: Skin is warm, dry and intact. Capillary refill takes less than 2 seconds.   Psychiatric: He has a normal mood and affect. His speech is normal. He is slowed.         CRANIAL NERVES     CN III, IV, VI   Pupils are equal, round, and reactive to light.  Extraocular motions are normal.        Significant Labs:   BMP:   Recent Labs   Lab 11/15/19  1225   *      K 3.2*   CL 98   CO2 30*   BUN 25*   CREATININE 1.9*   CALCIUM 9.6   MG 1.7     CBC:   Recent Labs   Lab 11/15/19  1225   WBC 15.09*   HGB 11.3*   HCT 34.3*   *     CMP:   Recent Labs   Lab 11/15/19  1225      K 3.2*   CL 98   CO2 30*   *   BUN 25*   CREATININE 1.9*   CALCIUM 9.6   PROT 8.2   ALBUMIN 3.5   BILITOT 0.7   ALKPHOS 41*   AST 12   ALT 15   ANIONGAP 13   EGFRNONAA 38.8*     Cardiac Markers:   Recent Labs   Lab 11/15/19  1225   BNP 50     Lactic Acid:   Recent Labs   Lab 11/15/19  1640    LACTATE 1.0     Magnesium:   Recent Labs   Lab 11/15/19  1225   MG 1.7     Troponin:   Recent Labs   Lab 11/15/19  1225   TROPONINI <0.030     Urine Studies: No results for input(s): COLORU, APPEARANCEUA, PHUR, SPECGRAV, PROTEINUA, GLUCUA, KETONESU, BILIRUBINUA, OCCULTUA, NITRITE, UROBILINOGEN, LEUKOCYTESUR, RBCUA, WBCUA, BACTERIA, SQUAMEPITHEL, HYALINECASTS in the last 48 hours.    Invalid input(s): IRAJR    Significant Imaging: I have reviewed all pertinent imaging results/findings within the past 24 hours.   X-ray Chest Ap Portable    Result Date: 11/15/2019  EXAMINATION: XR CHEST AP PORTABLE CLINICAL HISTORY: CHF; COMPARISON: 11/12/2019. FINDINGS: The heart size and pulmonary vasculature are within normal limits.  There is arteriosclerotic calcification within the aortic arch. The lungs are free of confluent infiltrate or significant volume loss.  No effusion is demonstrated.  Nipple shadows are observed in the lower lung zones bilaterally.     No acute cardiopulmonary disease. Electronically signed by: Mani Cartwright IV., MD Date:    11/15/2019 Time:    16:18    X-ray Chest Ap Portable    Result Date: 11/12/2019  EXAMINATION: XR CHEST AP PORTABLE CLINICAL HISTORY: Chills; FINDINGS: Portable chest at 14:09 hours without comparisons shows normal cardiomediastinal silhouette. Lungs are clear. Pulmonary vasculature is normal. There are degenerative changes of the left shoulder.     No acute cardiopulmonary abnormality. Electronically signed by: Anabell Tucker MD Date:    11/12/2019 Time:    14:26      Assessment/Plan:     * Dehydration  Possibly secondary to nausea and vomiting/diuretics/infection  Admit to Med-surg with remote tele  IV hydration  Prn antiemetics  Clear liquids and advance as tolerated  Follow creat BUN    UTI (urinary tract infection)  Patient treated for UTI a few days ago. Culture +E. Coli; Patient states he has been unable to take PO antibiotics. Reports dysuria has resolved.   Blood and  urine cultures pending  IV Rocephin  IV hydration        TREMAINE (acute kidney injury)  Secondary to dehydration and diuretics  Hold HCTZ  IV hydration  Prn antiemetics      Hypokalemia  Secondary to N/V and HCTZ  Hold diuretics at this time  Replacement in ED  AM labs   Cardiac monitoring      Intractable hiccups  Treatment as above  CT abd pelvis pending  Add chlorpromazine prn- given in ED with improvment        Intractable nausea and vomiting  No vomiting witnessed, only dry heaving in the ED  Unknown etiology; possibly infection, UTI  CT abdomen and pelvis pending- unable to use IV contrast secondary to increased creat  IV hydration  PRN antiemetics  Clear liquids and advance as tolerated       VTE Risk Mitigation (From admission, onward)         Ordered     enoxaparin injection 40 mg  Daily      11/15/19 1931     IP VTE LOW RISK PATIENT  Once      11/15/19 1931                   Kristen Ha NP  Department of Hospital Medicine   [facility]

## 2019-11-16 NOTE — ASSESSMENT & PLAN NOTE
Treatment as above  CT abd pelvis pending  Add chlorpromazine prn- given in ED with improvment

## 2019-11-16 NOTE — SUBJECTIVE & OBJECTIVE
Interval History: started on IV antibiotics    Review of Systems   HENT: Negative for congestion and sore throat.    Cardiovascular: Negative for chest pain and palpitations.   Gastrointestinal: Positive for nausea. Negative for abdominal pain, constipation, diarrhea and vomiting.   Genitourinary: Positive for frequency. Negative for dysuria, hematuria and urgency.   Skin: Negative for rash.     Objective:     Vital Signs (Most Recent):  Temp: 98.6 °F (37 °C) (11/16/19 1200)  Pulse: 76 (11/16/19 1200)  Resp: 18 (11/16/19 1200)  BP: 111/67 (11/16/19 1200)  SpO2: 98 % (11/16/19 1200) Vital Signs (24h Range):  Temp:  [97.9 °F (36.6 °C)-98.8 °F (37.1 °C)] 98.6 °F (37 °C)  Pulse:  [] 76  Resp:  [18] 18  SpO2:  [98 %-100 %] 98 %  BP: (104-133)/(56-78) 111/67     Weight: 60.8 kg (134 lb)  Body mass index is 18.17 kg/m².    Intake/Output Summary (Last 24 hours) at 11/16/2019 1524  Last data filed at 11/16/2019 0700  Gross per 24 hour   Intake 2550 ml   Output 700 ml   Net 1850 ml      Physical Exam   Constitutional: He is oriented to person, place, and time. He appears well-developed and well-nourished.   HENT:   Mouth/Throat: Oropharynx is clear and moist.   Eyes: Pupils are equal, round, and reactive to light. EOM are normal.   Cardiovascular: Normal rate, regular rhythm and normal heart sounds.   No murmur heard.  Pulmonary/Chest: Effort normal and breath sounds normal. No respiratory distress. He has no wheezes. He has no rales.   Abdominal: Soft. Bowel sounds are normal. There is no tenderness.   Genitourinary:   Genitourinary Comments: No CVA tenderness   Musculoskeletal: Normal range of motion.   Lymphadenopathy:     He has no cervical adenopathy.   Neurological: He is alert and oriented to person, place, and time. No cranial nerve deficit.   Skin: Skin is warm. Capillary refill takes less than 2 seconds.   Nursing note and vitals reviewed.      Significant Labs:   BMP:   Recent Labs   Lab 11/16/19  0524   GLU  109      K 2.8*      CO2 29   BUN 19   CREATININE 1.3   CALCIUM 8.5*   MG 1.4*     CBC:   Recent Labs   Lab 11/15/19  1225 11/16/19  0524   WBC 15.09* 9.38   HGB 11.3* 9.7*   HCT 34.3* 28.7*   * 428*     Magnesium:   Recent Labs   Lab 11/15/19  1225 11/16/19  0524   MG 1.7 1.4*     Urine Culture: No results for input(s): LABURIN in the last 48 hours.  Urine Studies:   Recent Labs   Lab 11/15/19  1820   COLORU Yellow   APPEARANCEUA Clear   PHUR 6.0   SPECGRAV 1.010   PROTEINUA Negative   GLUCUA Negative   KETONESU Negative   BILIRUBINUA Negative   OCCULTUA Negative   NITRITE Negative   UROBILINOGEN Negative   LEUKOCYTESUR Negative       Significant Imaging: I have reviewed all pertinent imaging results/findings within the past 24 hours.

## 2019-11-16 NOTE — PROGRESS NOTES
Add to H&P:    Plan and Assessment:     Pyelonephritis:    CT of Abdomen shows bilateral nonspecific perinephric fat stranding consistent with bilateral mild pyelonephritis. No hydronephrosis or renal/ureteral calculi.   Patient recently diagnosed with UTI and urine culture +E.coli sensitive to cetriaxone.   Will continue with IV Rocephin at this time.   Patient received 2L IVF in ED.   Blood and urine cultures pending. He is afebrile. WBC elevated 15. Lactate 1.0.

## 2019-11-16 NOTE — ED NOTES
Admitted to floor. Diagnosis, medications, & POC discussed with patient. Patient verbalized understanding. All questions and concerns answered. No needs expressed at the time. Pt is awake, alert and oriented with no acute distress noted. Respirations even and unlabored. Per stretcher out of ED to floor.

## 2019-11-16 NOTE — ASSESSMENT & PLAN NOTE
Possibly secondary to nausea and vomiting/diuretics/infection  Admit to Med-surg with remote tele  IV hydration  Prn antiemetics  Clear liquids and advance as tolerated  Follow maría BUN

## 2019-11-16 NOTE — ASSESSMENT & PLAN NOTE
Patient treated for UTI a few days ago. Culture +E. Coli; Patient states he has been unable to take PO antibiotics. Reports dysuria has resolved.   Blood and urine cultures pending  IV Rocephin  IV hydration

## 2019-11-16 NOTE — ASSESSMENT & PLAN NOTE
No vomiting witnessed, only dry heaving in the ED  Unknown etiology; possibly infection, UTI  CT abdomen and pelvis pending- unable to use IV contrast secondary to increased creat  IV hydration  PRN antiemetics  Clear liquids and advance as tolerated

## 2019-11-16 NOTE — ASSESSMENT & PLAN NOTE
Secondary to N/V and HCTZ  Hold diuretics at this time  Replacement in ED  AM labs   Cardiac monitoring

## 2019-11-16 NOTE — PROGRESS NOTES
[facility] Medicine  Progress Note    Patient Name: Yuriy Wilks  MRN: [id number]  Patient Class: OP- Observation   Admission Date: 11/15/2019  Length of Stay: 0 days  Attending Physician: Jayshree Martin MD  Primary Care Provider: Jacob Osullivan MD        Subjective:     Principal Problem:Pyelonephritis        HPI:  Mr. Wilks is a 54 year old male who presents to the ED with the complaint of intractable hiccups, weakness, nausea, and vomiting. The patient reports subjective fevers and chills for the past week. He was seen in the ED here at [facility] on 11/12/19 and treated for UTI. He was given IV Rocephin and prescribed Cipro to take home. Urine culture from that time +E.coli. The patient reports that since he was discharged home he has been having intractable nausea and vomiting. He now continues to have dry heaving and hiccups. He states the last thing he ate was a hot dog 2 days ago. He also reports that he continues to have subjective fevers. He states that dysuria has resolved. In the ED lab work shows WBC 15, creat increase from 1.1 to 1.9, potassium 3.2. CXR with no acute etiology. UA and culture pending. The patient is treated with IVF and multiple doses of antiemetics. He will be admitted for further evaluation and treatment.     Overview/Hospital Course:  No notes on file    Interval History: started on IV antibiotics    Review of Systems   HENT: Negative for congestion and sore throat.    Cardiovascular: Negative for chest pain and palpitations.   Gastrointestinal: Positive for nausea. Negative for abdominal pain, constipation, diarrhea and vomiting.   Genitourinary: Positive for frequency. Negative for dysuria, hematuria and urgency.   Skin: Negative for rash.     Objective:     Vital Signs (Most Recent):  Temp: 98.6 °F (37 °C) (11/16/19 1200)  Pulse: 76 (11/16/19 1200)  Resp: 18 (11/16/19 1200)  BP: 111/67 (11/16/19 1200)  SpO2: 98 % (11/16/19 1200) Vital Signs (24h  Range):  Temp:  [97.9 °F (36.6 °C)-98.8 °F (37.1 °C)] 98.6 °F (37 °C)  Pulse:  [] 76  Resp:  [18] 18  SpO2:  [98 %-100 %] 98 %  BP: (104-133)/(56-78) 111/67     Weight: 60.8 kg (134 lb)  Body mass index is 18.17 kg/m².    Intake/Output Summary (Last 24 hours) at 11/16/2019 1524  Last data filed at 11/16/2019 0700  Gross per 24 hour   Intake 2550 ml   Output 700 ml   Net 1850 ml      Physical Exam   Constitutional: He is oriented to person, place, and time. He appears well-developed and well-nourished.   HENT:   Mouth/Throat: Oropharynx is clear and moist.   Eyes: Pupils are equal, round, and reactive to light. EOM are normal.   Cardiovascular: Normal rate, regular rhythm and normal heart sounds.   No murmur heard.  Pulmonary/Chest: Effort normal and breath sounds normal. No respiratory distress. He has no wheezes. He has no rales.   Abdominal: Soft. Bowel sounds are normal. There is no tenderness.   Genitourinary:   Genitourinary Comments: No CVA tenderness   Musculoskeletal: Normal range of motion.   Lymphadenopathy:     He has no cervical adenopathy.   Neurological: He is alert and oriented to person, place, and time. No cranial nerve deficit.   Skin: Skin is warm. Capillary refill takes less than 2 seconds.   Nursing note and vitals reviewed.      Significant Labs:   BMP:   Recent Labs   Lab 11/16/19  0524         K 2.8*      CO2 29   BUN 19   CREATININE 1.3   CALCIUM 8.5*   MG 1.4*     CBC:   Recent Labs   Lab 11/15/19  1225 11/16/19  0524   WBC 15.09* 9.38   HGB 11.3* 9.7*   HCT 34.3* 28.7*   * 428*     Magnesium:   Recent Labs   Lab 11/15/19  1225 11/16/19  0524   MG 1.7 1.4*     Urine Culture: No results for input(s): LABURIN in the last 48 hours.  Urine Studies:   Recent Labs   Lab 11/15/19  1820   COLORU Yellow   APPEARANCEUA Clear   PHUR 6.0   SPECGRAV 1.010   PROTEINUA Negative   GLUCUA Negative   KETONESU Negative   BILIRUBINUA Negative   OCCULTUA Negative   NITRITE  Negative   UROBILINOGEN Negative   LEUKOCYTESUR Negative       Significant Imaging: I have reviewed all pertinent imaging results/findings within the past 24 hours.      Assessment/Plan:      * Pyelonephritis  Patient treated for UTI a few days ago. Culture +E. Coli; Patient states he has been unable to take PO antibiotics. Reports dysuria has resolved.   Follow up urine culture  Continue IV rocephin        TREMAINE (acute kidney injury)  Secondary to dehydration and diuretics  Hold HCTZ  IV hydration  Prn antiemetics      Hypokalemia  Potassium 40mwq PO x 2 doses  Electrolyte sliding scale  Replete magnesium      Hypomagnesemia  Replete with IV magnesium      Intractable nausea and vomiting  Continue PRN antiemetics.  Full liquid diet      Intractable hiccups  Treatment as above  continue chlorpromazine prn-         Dehydration  IV hydration        VTE Risk Mitigation (From admission, onward)         Ordered     enoxaparin injection 40 mg  Daily      11/15/19 1931     IP VTE LOW RISK PATIENT  Once      11/15/19 1931                      Jayshree Martin MD  Department of Hospital Medicine   On license of [facility]

## 2019-11-16 NOTE — SUBJECTIVE & OBJECTIVE
Past Medical History:   Diagnosis Date    Hyperlipidemia     Hypertension     Multiple sclerosis        No past surgical history on file.    Review of patient's allergies indicates:  No Known Allergies    No current facility-administered medications on file prior to encounter.      Current Outpatient Medications on File Prior to Encounter   Medication Sig    baclofen (LIORESAL) 10 MG tablet Take 10 mg by mouth 2 (two) times daily.    ciprofloxacin HCl (CIPRO) 500 MG tablet Take 1 tablet (500 mg total) by mouth 2 (two) times daily. for 10 days    dimethyl fumarate (TECFIDERA ORAL) Take 240 mg by mouth 2 (two) times daily.    hydroCHLOROthiazide (HYDRODIURIL) 25 MG tablet Take 25 mg by mouth once daily.    ibuprofen (ADVIL,MOTRIN) 800 MG tablet Take 800 mg by mouth 2 (two) times daily as needed.     simvastatin (ZOCOR) 40 MG tablet Take 40 mg by mouth every evening.    ondansetron (ZOFRAN) 4 MG tablet Take 1 tablet (4 mg total) by mouth every 6 (six) hours.    potassium chloride (KLOR-CON) 10 MEQ TbSR Take 4 tablets (40 mEq total) by mouth once daily.     Family History     None        Tobacco Use    Smoking status: Not on file   Substance and Sexual Activity    Alcohol use: Not on file    Drug use: Not on file    Sexual activity: Not on file     Review of Systems   Constitutional: Positive for appetite change, chills and fever. Negative for diaphoresis and fatigue.   HENT: Negative for congestion, ear pain, nosebleeds, rhinorrhea, sore throat and voice change.    Eyes: Negative for visual disturbance.   Respiratory: Negative for cough, choking, chest tightness, shortness of breath and wheezing.    Cardiovascular: Negative for chest pain, palpitations and leg swelling.   Gastrointestinal: Positive for abdominal pain, nausea and vomiting. Negative for abdominal distention, blood in stool, constipation and diarrhea.   Genitourinary: Negative for difficulty urinating, dysuria, flank pain, frequency and  hematuria.   Musculoskeletal: Negative for gait problem, joint swelling, myalgias, neck pain and neck stiffness.   Skin: Negative for rash and wound.   Neurological: Positive for weakness. Negative for dizziness, tremors, seizures, syncope, facial asymmetry, speech difficulty, light-headedness, numbness and headaches.   Psychiatric/Behavioral: Negative for confusion and hallucinations.     Objective:     Vital Signs (Most Recent):  Temp: 98.7 °F (37.1 °C) (11/15/19 1150)  Pulse: 71 (11/15/19 2000)  Resp: 20 (11/15/19 1150)  BP: 106/62 (11/15/19 2000)  SpO2: 99 % (11/15/19 2000) Vital Signs (24h Range):  Temp:  [98.7 °F (37.1 °C)] 98.7 °F (37.1 °C)  Pulse:  [] 71  Resp:  [20] 20  SpO2:  [99 %-100 %] 99 %  BP: (104-136)/(56-76) 106/62     Weight: 69.9 kg (154 lb)  Body mass index is 20.89 kg/m².    Physical Exam   Constitutional: He is oriented to person, place, and time. He appears well-developed and well-nourished. He appears lethargic. He appears ill.   HENT:   Head: Normocephalic and atraumatic.   Eyes: Pupils are equal, round, and reactive to light. EOM are normal.   Neck: Normal range of motion. Neck supple.   Cardiovascular: Normal rate, regular rhythm, normal heart sounds and intact distal pulses.   Pulmonary/Chest: Effort normal and breath sounds normal.   Abdominal: Soft. Bowel sounds are normal. There is no tenderness.   Intractable hiccups and nausea   Musculoskeletal: Normal range of motion.   Neurological: He is oriented to person, place, and time. He appears lethargic. GCS eye subscore is 4. GCS verbal subscore is 5. GCS motor subscore is 6.   Generalized weakness. History of MS   Skin: Skin is warm, dry and intact. Capillary refill takes less than 2 seconds.   Psychiatric: He has a normal mood and affect. His speech is normal. He is slowed.         CRANIAL NERVES     CN III, IV, VI   Pupils are equal, round, and reactive to light.  Extraocular motions are normal.        Significant Labs:   BMP:    Recent Labs   Lab 11/15/19  1225   *      K 3.2*   CL 98   CO2 30*   BUN 25*   CREATININE 1.9*   CALCIUM 9.6   MG 1.7     CBC:   Recent Labs   Lab 11/15/19  1225   WBC 15.09*   HGB 11.3*   HCT 34.3*   *     CMP:   Recent Labs   Lab 11/15/19  1225      K 3.2*   CL 98   CO2 30*   *   BUN 25*   CREATININE 1.9*   CALCIUM 9.6   PROT 8.2   ALBUMIN 3.5   BILITOT 0.7   ALKPHOS 41*   AST 12   ALT 15   ANIONGAP 13   EGFRNONAA 38.8*     Cardiac Markers:   Recent Labs   Lab 11/15/19  1225   BNP 50     Lactic Acid:   Recent Labs   Lab 11/15/19  1648   LACTATE 1.0     Magnesium:   Recent Labs   Lab 11/15/19  1225   MG 1.7     Troponin:   Recent Labs   Lab 11/15/19  1225   TROPONINI <0.030     Urine Studies: No results for input(s): COLORU, APPEARANCEUA, PHUR, SPECGRAV, PROTEINUA, GLUCUA, KETONESU, BILIRUBINUA, OCCULTUA, NITRITE, UROBILINOGEN, LEUKOCYTESUR, RBCUA, WBCUA, BACTERIA, SQUAMEPITHEL, HYALINECASTS in the last 48 hours.    Invalid input(s): WRIGHTSUR    Significant Imaging: I have reviewed all pertinent imaging results/findings within the past 24 hours.   X-ray Chest Ap Portable    Result Date: 11/15/2019  EXAMINATION: XR CHEST AP PORTABLE CLINICAL HISTORY: CHF; COMPARISON: 11/12/2019. FINDINGS: The heart size and pulmonary vasculature are within normal limits.  There is arteriosclerotic calcification within the aortic arch. The lungs are free of confluent infiltrate or significant volume loss.  No effusion is demonstrated.  Nipple shadows are observed in the lower lung zones bilaterally.     No acute cardiopulmonary disease. Electronically signed by: Mani Cartwright IV., MD Date:    11/15/2019 Time:    16:18    X-ray Chest Ap Portable    Result Date: 11/12/2019  EXAMINATION: XR CHEST AP PORTABLE CLINICAL HISTORY: Chills; FINDINGS: Portable chest at 14:09 hours without comparisons shows normal cardiomediastinal silhouette. Lungs are clear. Pulmonary vasculature is normal. There are  degenerative changes of the left shoulder.     No acute cardiopulmonary abnormality. Electronically signed by: Anabell Tucker MD Date:    11/12/2019 Time:    14:26

## 2019-11-17 VITALS
HEART RATE: 75 BPM | SYSTOLIC BLOOD PRESSURE: 94 MMHG | OXYGEN SATURATION: 100 % | TEMPERATURE: 99 F | HEIGHT: 72 IN | DIASTOLIC BLOOD PRESSURE: 59 MMHG | RESPIRATION RATE: 18 BRPM | BODY MASS INDEX: 18.15 KG/M2 | WEIGHT: 134 LBS

## 2019-11-17 PROBLEM — N17.9 AKI (ACUTE KIDNEY INJURY): Status: RESOLVED | Noted: 2019-11-15 | Resolved: 2019-11-17

## 2019-11-17 PROBLEM — E86.0 DEHYDRATION: Status: RESOLVED | Noted: 2019-11-15 | Resolved: 2019-11-17

## 2019-11-17 PROBLEM — R11.2 INTRACTABLE NAUSEA AND VOMITING: Status: RESOLVED | Noted: 2019-11-15 | Resolved: 2019-11-17

## 2019-11-17 PROBLEM — E83.42 HYPOMAGNESEMIA: Status: RESOLVED | Noted: 2019-11-16 | Resolved: 2019-11-17

## 2019-11-17 PROBLEM — E87.6 HYPOKALEMIA: Status: RESOLVED | Noted: 2019-11-15 | Resolved: 2019-11-17

## 2019-11-17 LAB
BASOPHILS # BLD AUTO: 0.02 K/UL (ref 0–0.2)
BASOPHILS NFR BLD: 0.2 % (ref 0–1.9)
DIFFERENTIAL METHOD: ABNORMAL
EOSINOPHIL # BLD AUTO: 0.1 K/UL (ref 0–0.5)
EOSINOPHIL NFR BLD: 1.2 % (ref 0–8)
ERYTHROCYTE [DISTWIDTH] IN BLOOD BY AUTOMATED COUNT: 14.4 % (ref 11.5–14.5)
HCT VFR BLD AUTO: 26.5 % (ref 40–54)
HGB BLD-MCNC: 9 G/DL (ref 14–18)
IMM GRANULOCYTES # BLD AUTO: 0.04 K/UL (ref 0–0.04)
IMM GRANULOCYTES NFR BLD AUTO: 0.4 % (ref 0–0.5)
LYMPHOCYTES # BLD AUTO: 2.3 K/UL (ref 1–4.8)
LYMPHOCYTES NFR BLD: 22.9 % (ref 18–48)
MAGNESIUM SERPL-MCNC: 1.6 MG/DL (ref 1.6–2.6)
MCH RBC QN AUTO: 31.3 PG (ref 27–31)
MCHC RBC AUTO-ENTMCNC: 34 G/DL (ref 32–36)
MCV RBC AUTO: 92 FL (ref 82–98)
MONOCYTES # BLD AUTO: 0.8 K/UL (ref 0.3–1)
MONOCYTES NFR BLD: 8.4 % (ref 4–15)
NEUTROPHILS # BLD AUTO: 6.6 K/UL (ref 1.8–7.7)
NEUTROPHILS NFR BLD: 66.9 % (ref 38–73)
NRBC BLD-RTO: 0 /100 WBC
PLATELET # BLD AUTO: 392 K/UL (ref 150–350)
PMV BLD AUTO: 9.7 FL (ref 9.2–12.9)
RBC # BLD AUTO: 2.88 M/UL (ref 4.6–6.2)
WBC # BLD AUTO: 9.86 K/UL (ref 3.9–12.7)

## 2019-11-17 PROCEDURE — 63600175 PHARM REV CODE 636 W HCPCS: Performed by: NURSE PRACTITIONER

## 2019-11-17 PROCEDURE — 36415 COLL VENOUS BLD VENIPUNCTURE: CPT

## 2019-11-17 PROCEDURE — 85025 COMPLETE CBC W/AUTO DIFF WBC: CPT

## 2019-11-17 PROCEDURE — 25000003 PHARM REV CODE 250: Performed by: NURSE PRACTITIONER

## 2019-11-17 PROCEDURE — 25000003 PHARM REV CODE 250: Performed by: INTERNAL MEDICINE

## 2019-11-17 PROCEDURE — G0378 HOSPITAL OBSERVATION PER HR: HCPCS

## 2019-11-17 PROCEDURE — 83735 ASSAY OF MAGNESIUM: CPT

## 2019-11-17 PROCEDURE — 96372 THER/PROPH/DIAG INJ SC/IM: CPT | Mod: 59

## 2019-11-17 RX ORDER — LEVOFLOXACIN 500 MG/1
500 TABLET, FILM COATED ORAL DAILY
Status: DISCONTINUED | OUTPATIENT
Start: 2019-11-17 | End: 2019-11-17 | Stop reason: HOSPADM

## 2019-11-17 RX ORDER — CIPROFLOXACIN 500 MG/1
500 TABLET ORAL EVERY 12 HOURS
Status: DISCONTINUED | OUTPATIENT
Start: 2019-11-17 | End: 2019-11-17

## 2019-11-17 RX ORDER — CHLORPROMAZINE HYDROCHLORIDE 25 MG/1
25 TABLET, FILM COATED ORAL 3 TIMES DAILY PRN
Qty: 30 TABLET | Refills: 0 | Status: SHIPPED | OUTPATIENT
Start: 2019-11-17 | End: 2022-12-13

## 2019-11-17 RX ADMIN — ENOXAPARIN SODIUM 40 MG: 100 INJECTION SUBCUTANEOUS at 04:11

## 2019-11-17 RX ADMIN — LEVOFLOXACIN 500 MG: 500 TABLET, FILM COATED ORAL at 04:11

## 2019-11-17 RX ADMIN — BACLOFEN 10 MG: 10 TABLET ORAL at 08:11

## 2019-11-17 RX ADMIN — SODIUM CHLORIDE: 0.9 INJECTION, SOLUTION INTRAVENOUS at 04:11

## 2019-11-17 NOTE — NURSING
D/c to home with family. Offered Influenza and stated I have never taken it so no need to take it now.

## 2019-11-18 NOTE — HOSPITAL COURSE
Started on IV antibiotics and a urine culture was sent. Urine culture had no growth. Patient was treated with anti emetics and his nausea resolved. He is able to tolerate PO, and so his antibiotics were switched to oral route

## 2019-11-18 NOTE — DISCHARGE SUMMARY
[facility]  Discharge Summary      Patient Name: Yuriy Wilks  MRN: [id number]  Admission Date: 11/15/2019  Hospital Length of Stay: 0 days  Discharge Date and Time: 11/17/2019  4:25 PM  Attending Physician: No att. providers found   Discharging Provider: Jayshree Martin MD  Primary Care Provider: Jacob Osullivan MD      HPI:   Mr. Wilks is a 54 year old male who presents to the ED with the complaint of intractable hiccups, weakness, nausea, and vomiting. The patient reports subjective fevers and chills for the past week. He was seen in the ED here at [facility] on 11/12/19 and treated for UTI. He was given IV Rocephin and prescribed Cipro to take home. Urine culture from that time +E.coli. The patient reports that since he was discharged home he has been having intractable nausea and vomiting. He now continues to have dry heaving and hiccups. He states the last thing he ate was a hot dog 2 days ago. He also reports that he continues to have subjective fevers. He states that dysuria has resolved. In the ED lab work shows WBC 15, creat increase from 1.1 to 1.9, potassium 3.2. CXR with no acute etiology. UA and culture pending. The patient is treated with IVF and multiple doses of antiemetics. He will be admitted for further evaluation and treatment.     * No surgery found *      Hospital Course:   Started on IV antibiotics and a urine culture was sent. Urine culture had no growth. Patient was treated with anti emetics and his nausea resolved. He is able to tolerate PO, and so his antibiotics were switched to oral route      Consults:   Consults (From admission, onward)        Status Ordering Provider     Inpatient consult to Registered Dietitian/Nutritionist  Once     Provider:  (Not yet assigned)    Completed CLAIR MCDONOUGH     IP consult to case management  Once     Provider:  (Not yet assigned)    Completed CLAIR MCDONOUGH          No new Assessment & Plan notes have been  filed under this hospital service since the last note was generated.  Service: Hospital Medicine    Final Active Diagnoses:    Diagnosis Date Noted POA    PRINCIPAL PROBLEM:  Pyelonephritis [N12] 11/15/2019 Yes    Intractable hiccups [R06.6] 11/15/2019 Yes      Problems Resolved During this Admission:    Diagnosis Date Noted Date Resolved POA    TREMAINE (acute kidney injury) [N17.9] 11/15/2019 11/17/2019 Yes    Hypokalemia [E87.6] 11/15/2019 11/17/2019 Yes    Hypomagnesemia [E83.42] 11/16/2019 11/17/2019 No    Intractable nausea and vomiting [R11.2] 11/15/2019 11/17/2019 Yes    Dehydration [E86.0] 11/15/2019 11/17/2019 Yes       Discharged Condition: fair    Disposition: Home or Self Care    Follow Up:  Follow-up Information     Jacob Osullivan MD In 2 weeks.    Specialty:  Family Medicine  Contact information:  [address] [address] [phone]                 Patient Instructions:      Diet Adult Regular     Activity as tolerated       Significant Diagnostic Studies: Labs:   BMP:   Recent Labs   Lab 11/17/19  0555   MG 1.6    and CBC   Recent Labs   Lab 11/17/19  0555   WBC 9.86   HGB 9.0*   HCT 26.5*   *       Pending Diagnostic Studies:     Procedure Component Value Units Date/Time    Urinalysis Catheterized [[id number]] Collected:  11/15/19 1821    Order Status:  Sent Lab Status:  In process Updated:  11/15/19 2209    Specimen:  Urine, Clean Catch          Medications:  Reconciled Home Medications:      Medication List      START taking these medications    chlorproMAZINE 25 MG tablet  Commonly known as:  THORAZINE  Take 1 tablet (25 mg total) by mouth 3 (three) times daily as needed.        CONTINUE taking these medications    baclofen 10 MG tablet  Commonly known as:  LIORESAL  Take 10 mg by mouth 2 (two) times daily.     ciprofloxacin HCl 500 MG tablet  Commonly known as:  CIPRO  Take 1 tablet (500 mg total) by mouth 2 (two) times daily. for 10 days     hydroCHLOROthiazide 25 MG  tablet  Commonly known as:  HYDRODIURIL  Take 25 mg by mouth once daily.     ibuprofen 800 MG tablet  Commonly known as:  ADVIL,MOTRIN  Take 800 mg by mouth 2 (two) times daily as needed.     ondansetron 4 MG tablet  Commonly known as:  ZOFRAN  Take 1 tablet (4 mg total) by mouth every 6 (six) hours.     potassium chloride 10 MEQ Tbsr  Commonly known as:  KLOR-CON  Take 4 tablets (40 mEq total) by mouth once daily.     simvastatin 40 MG tablet  Commonly known as:  ZOCOR  Take 40 mg by mouth every evening.     TECFIDERA ORAL  Take 240 mg by mouth 2 (two) times daily.            Indwelling Lines/Drains at time of discharge:   Lines/Drains/Airways     None                 Time spent on the discharge of patient: 25 minutes  Patient was seen and examined on the date of discharge and determined to be suitable for discharge.         Jayshree Martin MD  Department of Hospital Medicine  [facility]

## 2019-11-19 LAB — BACTERIA UR CULT: NO GROWTH

## 2019-11-20 LAB — BACTERIA BLD CULT: NORMAL

## 2019-11-21 LAB — BACTERIA BLD CULT: NORMAL

## 2020-03-26 ENCOUNTER — HOSPITAL ENCOUNTER (EMERGENCY)
Facility: HOSPITAL | Age: 55
Discharge: HOME | End: 2020-03-26
Attending: EMERGENCY MEDICINE
Payer: MEDICAID

## 2020-03-26 VITALS
WEIGHT: 170 LBS | OXYGEN SATURATION: 97 % | HEART RATE: 72 BPM | BODY MASS INDEX: 23.03 KG/M2 | DIASTOLIC BLOOD PRESSURE: 67 MMHG | HEIGHT: 72 IN | SYSTOLIC BLOOD PRESSURE: 137 MMHG | RESPIRATION RATE: 19 BRPM | TEMPERATURE: 99 F

## 2020-03-26 DIAGNOSIS — R06.6 INTRACTABLE HICCUPS: Primary | ICD-10-CM

## 2020-03-26 DIAGNOSIS — J18.9 PNEUMONIA OF LEFT LOWER LOBE DUE TO INFECTIOUS ORGANISM: ICD-10-CM

## 2020-03-26 LAB
ALBUMIN SERPL BCP-MCNC: 3.6 G/DL (ref 3.5–5.2)
ALP SERPL-CCNC: 50 U/L (ref 55–135)
ALT SERPL W/O P-5'-P-CCNC: 14 U/L (ref 10–44)
ANION GAP SERPL CALC-SCNC: 15 MMOL/L (ref 8–16)
AST SERPL-CCNC: 15 U/L (ref 10–40)
BASOPHILS # BLD AUTO: 0.01 K/UL (ref 0–0.2)
BASOPHILS NFR BLD: 0.1 % (ref 0–1.9)
BILIRUB SERPL-MCNC: 1.3 MG/DL (ref 0.1–1)
BUN SERPL-MCNC: 22 MG/DL (ref 6–20)
CALCIUM SERPL-MCNC: 9 MG/DL (ref 8.7–10.5)
CHLORIDE SERPL-SCNC: 93 MMOL/L (ref 95–110)
CO2 SERPL-SCNC: 29 MMOL/L (ref 23–29)
CREAT SERPL-MCNC: 1.1 MG/DL (ref 0.5–1.4)
DIFFERENTIAL METHOD: NORMAL
EOSINOPHIL # BLD AUTO: 0 K/UL (ref 0–0.5)
EOSINOPHIL NFR BLD: 0 % (ref 0–8)
ERYTHROCYTE [DISTWIDTH] IN BLOOD BY AUTOMATED COUNT: 13.9 % (ref 11.5–14.5)
EST. GFR  (AFRICAN AMERICAN): >60 ML/MIN/1.73 M^2
EST. GFR  (NON AFRICAN AMERICAN): >60 ML/MIN/1.73 M^2
GLUCOSE SERPL-MCNC: 108 MG/DL (ref 70–110)
HCT VFR BLD AUTO: 43.7 % (ref 40–54)
HGB BLD-MCNC: 15 G/DL (ref 14–18)
IMM GRANULOCYTES # BLD AUTO: 0.04 K/UL (ref 0–0.04)
IMM GRANULOCYTES NFR BLD AUTO: 0.5 % (ref 0–0.5)
LYMPHOCYTES # BLD AUTO: 2.2 K/UL (ref 1–4.8)
LYMPHOCYTES NFR BLD: 28.3 % (ref 18–48)
MAGNESIUM SERPL-MCNC: 1.8 MG/DL (ref 1.6–2.6)
MCH RBC QN AUTO: 30.9 PG (ref 27–31)
MCHC RBC AUTO-ENTMCNC: 34.3 G/DL (ref 32–36)
MCV RBC AUTO: 90 FL (ref 82–98)
MONOCYTES # BLD AUTO: 0.9 K/UL (ref 0.3–1)
MONOCYTES NFR BLD: 11.9 % (ref 4–15)
NEUTROPHILS # BLD AUTO: 4.6 K/UL (ref 1.8–7.7)
NEUTROPHILS NFR BLD: 59.2 % (ref 38–73)
NRBC BLD-RTO: 0 /100 WBC
PLATELET # BLD AUTO: 270 K/UL (ref 150–350)
PMV BLD AUTO: 10.4 FL (ref 9.2–12.9)
POTASSIUM SERPL-SCNC: 3 MMOL/L (ref 3.5–5.1)
PROT SERPL-MCNC: 8.3 G/DL (ref 6–8.4)
RBC # BLD AUTO: 4.86 M/UL (ref 4.6–6.2)
SODIUM SERPL-SCNC: 137 MMOL/L (ref 136–145)
WBC # BLD AUTO: 7.75 K/UL (ref 3.9–12.7)

## 2020-03-26 PROCEDURE — 99284 EMERGENCY DEPT VISIT MOD MDM: CPT | Mod: 25

## 2020-03-26 PROCEDURE — 85025 COMPLETE CBC W/AUTO DIFF WBC: CPT

## 2020-03-26 PROCEDURE — 25000003 PHARM REV CODE 250: Performed by: EMERGENCY MEDICINE

## 2020-03-26 PROCEDURE — 83735 ASSAY OF MAGNESIUM: CPT

## 2020-03-26 PROCEDURE — 36415 COLL VENOUS BLD VENIPUNCTURE: CPT

## 2020-03-26 PROCEDURE — 80053 COMPREHEN METABOLIC PANEL: CPT

## 2020-03-26 RX ORDER — POTASSIUM CHLORIDE 20 MEQ/1
40 TABLET, EXTENDED RELEASE ORAL
Status: COMPLETED | OUTPATIENT
Start: 2020-03-26 | End: 2020-03-26

## 2020-03-26 RX ORDER — LEVOFLOXACIN 750 MG/1
750 TABLET ORAL DAILY
Qty: 10 TABLET | Refills: 0 | Status: SHIPPED | OUTPATIENT
Start: 2020-03-26 | End: 2021-04-29 | Stop reason: ALTCHOICE

## 2020-03-26 RX ADMIN — POTASSIUM CHLORIDE 40 MEQ: 20 TABLET, EXTENDED RELEASE ORAL at 11:03

## 2020-03-26 NOTE — ED PROVIDER NOTES
Encounter Date: 3/26/2020       History     Chief Complaint   Patient presents with    Hiccups     X 1 WEEK     Patient here with reported history of hiccups onset over last week patient has a history of chronic hiccups for many years he has a history of hyperlipidemia hypertension and multiple sclerosis he denies any fever chills denies any shortness of breath no abdominal pain no nausea vomiting or diarrhea as before occasional cough no dysuria urgency or frequency        Review of patient's allergies indicates:  No Known Allergies  Past Medical History:   Diagnosis Date    Hyperlipidemia     Hypertension     Multiple sclerosis      No past surgical history on file.  No family history on file.  Social History     Tobacco Use    Smoking status: Current Every Day Smoker     Packs/day: 1.00     Types: Cigarettes     Start date: 1979   Substance Use Topics    Alcohol use: Not Currently    Drug use: Not Currently     Review of Systems   Constitutional: Negative.    HENT: Negative.    Eyes: Negative.    Respiratory: Positive for cough. Negative for chest tightness, shortness of breath, wheezing and stridor.    Cardiovascular: Negative.    Gastrointestinal: Negative.  Negative for abdominal distention, abdominal pain, constipation, diarrhea, nausea and vomiting.   Endocrine: Negative.    Genitourinary: Negative.    Musculoskeletal: Negative.    Skin: Negative.    Allergic/Immunologic: Negative.    Neurological: Negative.    Hematological: Negative.    Psychiatric/Behavioral: Negative.        Physical Exam     Initial Vitals [03/26/20 0824]   BP Pulse Resp Temp SpO2   139/85 83 18 98.5 °F (36.9 °C) 97 %      MAP       --         Physical Exam    Constitutional: He appears well-developed and well-nourished. No distress.   HENT:   Head: Normocephalic and atraumatic.   Right Ear: External ear normal.   Left Ear: External ear normal.   Nose: Nose normal.   Mouth/Throat: Oropharynx is clear and moist.   Eyes:  Conjunctivae are normal. Pupils are equal, round, and reactive to light.   Neck: Normal range of motion. Neck supple.   Cardiovascular: Normal rate, regular rhythm, normal heart sounds and intact distal pulses.   Pulmonary/Chest: Breath sounds normal. No respiratory distress. He has no wheezes.   Abdominal: Soft. Bowel sounds are normal. He exhibits no distension. There is no tenderness.   Musculoskeletal: Normal range of motion. He exhibits no edema or tenderness.   Lymphadenopathy:     He has no cervical adenopathy.   Neurological: He is alert and oriented to person, place, and time. He has normal strength. GCS score is 15. GCS eye subscore is 4. GCS verbal subscore is 5. GCS motor subscore is 6.   Skin: Skin is warm and dry. Capillary refill takes less than 2 seconds.   Psychiatric: He has a normal mood and affect. His behavior is normal.         ED Course   Procedures  Labs Reviewed   CBC W/ AUTO DIFFERENTIAL   COMPREHENSIVE METABOLIC PANEL   MAGNESIUM          Imaging Results    None          Medical Decision Making:   ED Management:  Patient here with reported hiccups occasional cough no hypoxia no fever laboratory evaluation shows normal white blood cell count potassium was low at 3.0 he was given potassium chest x-ray shows atelectasis versus early infiltrate left lower lobe will treat as possible pneumonia given his cough and recommend follow-up                                 Clinical Impression:       ICD-10-CM ICD-9-CM   1. Intractable hiccups R06.6 786.8   2. Pneumonia of left lower lobe due to infectious organism J18.1 486                                Armando Babin MD  03/26/20 1115

## 2021-04-27 PROBLEM — M54.9 CHRONIC BACK PAIN: Status: ACTIVE | Noted: 2018-01-15

## 2021-04-27 PROBLEM — I10 ESSENTIAL HYPERTENSION: Status: ACTIVE | Noted: 2021-04-27

## 2021-04-27 PROBLEM — G89.29 CHRONIC BACK PAIN: Status: ACTIVE | Noted: 2018-01-15

## 2021-04-27 PROBLEM — E78.5 HYPERLIPIDEMIA: Status: ACTIVE | Noted: 2021-04-27

## 2021-04-29 ENCOUNTER — OFFICE VISIT (OUTPATIENT)
Dept: FAMILY MEDICINE | Facility: CLINIC | Age: 56
End: 2021-04-29
Payer: MEDICAID

## 2021-04-29 ENCOUNTER — LAB VISIT (OUTPATIENT)
Dept: LAB | Facility: CLINIC | Age: 56
End: 2021-04-29
Payer: MEDICAID

## 2021-04-29 ENCOUNTER — TELEPHONE (OUTPATIENT)
Dept: FAMILY MEDICINE | Facility: CLINIC | Age: 56
End: 2021-04-29

## 2021-04-29 VITALS
TEMPERATURE: 99 F | OXYGEN SATURATION: 99 % | SYSTOLIC BLOOD PRESSURE: 126 MMHG | DIASTOLIC BLOOD PRESSURE: 82 MMHG | WEIGHT: 174 LBS | HEART RATE: 61 BPM | RESPIRATION RATE: 16 BRPM | HEIGHT: 72 IN | BODY MASS INDEX: 23.57 KG/M2

## 2021-04-29 DIAGNOSIS — G35 MULTIPLE SCLEROSIS, RELAPSING-REMITTING: ICD-10-CM

## 2021-04-29 DIAGNOSIS — E78.2 MIXED HYPERLIPIDEMIA: ICD-10-CM

## 2021-04-29 DIAGNOSIS — F10.10 ALCOHOL ABUSE: ICD-10-CM

## 2021-04-29 DIAGNOSIS — I10 ESSENTIAL HYPERTENSION: ICD-10-CM

## 2021-04-29 DIAGNOSIS — Z12.11 COLON CANCER SCREENING: ICD-10-CM

## 2021-04-29 DIAGNOSIS — R35.0 INCREASED URINARY FREQUENCY: ICD-10-CM

## 2021-04-29 DIAGNOSIS — R06.6 INTRACTABLE HICCUPS: ICD-10-CM

## 2021-04-29 DIAGNOSIS — R74.8 ELEVATED LIVER ENZYMES: ICD-10-CM

## 2021-04-29 DIAGNOSIS — M47.816 LUMBAR SPONDYLOSIS: ICD-10-CM

## 2021-04-29 DIAGNOSIS — M47.812 CERVICAL SPONDYLOSIS WITHOUT MYELOPATHY: ICD-10-CM

## 2021-04-29 DIAGNOSIS — N30.01 ACUTE CYSTITIS WITH HEMATURIA: Primary | ICD-10-CM

## 2021-04-29 LAB
ALBUMIN SERPL BCP-MCNC: 4.3 G/DL (ref 3.5–5.2)
ALP SERPL-CCNC: 55 U/L (ref 55–135)
ALT SERPL W/O P-5'-P-CCNC: 80 U/L (ref 10–44)
ANION GAP SERPL CALC-SCNC: 12 MMOL/L (ref 8–16)
AST SERPL-CCNC: 45 U/L (ref 10–40)
BILIRUB SERPL-MCNC: 1.1 MG/DL (ref 0.1–1)
BILIRUB SERPL-MCNC: ABNORMAL MG/DL
BLOOD URINE, POC: ABNORMAL
BUN SERPL-MCNC: 23 MG/DL (ref 6–20)
CALCIUM SERPL-MCNC: 10 MG/DL (ref 8.7–10.5)
CHLORIDE SERPL-SCNC: 100 MMOL/L (ref 95–110)
CHOLEST SERPL-MCNC: 197 MG/DL (ref 120–199)
CHOLEST/HDLC SERPL: 2.3 {RATIO} (ref 2–5)
CO2 SERPL-SCNC: 29 MMOL/L (ref 23–29)
COLOR, POC UA: ABNORMAL
CREAT SERPL-MCNC: 1.2 MG/DL (ref 0.5–1.4)
EST. GFR  (AFRICAN AMERICAN): >60 ML/MIN/1.73 M^2
EST. GFR  (NON AFRICAN AMERICAN): >60 ML/MIN/1.73 M^2
GLUCOSE SERPL-MCNC: 97 MG/DL (ref 70–110)
GLUCOSE UR QL STRIP: NORMAL
HDLC SERPL-MCNC: 85 MG/DL (ref 40–75)
HDLC SERPL: 43.1 % (ref 20–50)
KETONES UR QL STRIP: ABNORMAL
LDLC SERPL CALC-MCNC: 101.6 MG/DL (ref 63–159)
LEUKOCYTE ESTERASE URINE, POC: ABNORMAL
NITRITE, POC UA: ABNORMAL
NONHDLC SERPL-MCNC: 112 MG/DL
PH, POC UA: 5
POTASSIUM SERPL-SCNC: 3.6 MMOL/L (ref 3.5–5.1)
PROT SERPL-MCNC: 8.7 G/DL (ref 6–8.4)
PROTEIN, POC: ABNORMAL
SODIUM SERPL-SCNC: 141 MMOL/L (ref 136–145)
SPECIFIC GRAVITY, POC UA: 1.02
TRIGL SERPL-MCNC: 52 MG/DL (ref 30–150)
UROBILINOGEN, POC UA: 1

## 2021-04-29 PROCEDURE — 80053 COMPREHEN METABOLIC PANEL: CPT | Performed by: NURSE PRACTITIONER

## 2021-04-29 PROCEDURE — 80061 LIPID PANEL: CPT | Performed by: NURSE PRACTITIONER

## 2021-04-29 PROCEDURE — 81001 POCT URINALYSIS, DIPSTICK OR TABLET REAGENT, AUTOMATED, WITH MICROSCOP: ICD-10-PCS | Mod: S$GLB,,, | Performed by: NURSE PRACTITIONER

## 2021-04-29 PROCEDURE — 99214 OFFICE O/P EST MOD 30 MIN: CPT | Mod: 25,S$GLB,, | Performed by: NURSE PRACTITIONER

## 2021-04-29 PROCEDURE — 87086 URINE CULTURE/COLONY COUNT: CPT | Performed by: NURSE PRACTITIONER

## 2021-04-29 PROCEDURE — 36415 PR COLLECTION VENOUS BLOOD,VENIPUNCTURE: ICD-10-PCS | Mod: ,,, | Performed by: NURSE PRACTITIONER

## 2021-04-29 PROCEDURE — 99214 PR OFFICE/OUTPT VISIT, EST, LEVL IV, 30-39 MIN: ICD-10-PCS | Mod: 25,S$GLB,, | Performed by: NURSE PRACTITIONER

## 2021-04-29 PROCEDURE — 87088 URINE BACTERIA CULTURE: CPT | Performed by: NURSE PRACTITIONER

## 2021-04-29 PROCEDURE — 36415 COLL VENOUS BLD VENIPUNCTURE: CPT | Mod: ,,, | Performed by: NURSE PRACTITIONER

## 2021-04-29 PROCEDURE — 81001 URINALYSIS AUTO W/SCOPE: CPT | Mod: S$GLB,,, | Performed by: NURSE PRACTITIONER

## 2021-04-29 RX ORDER — HYDROCHLOROTHIAZIDE 25 MG/1
25 TABLET ORAL DAILY
Qty: 30 TABLET | Refills: 5 | Status: SHIPPED | OUTPATIENT
Start: 2021-04-29 | End: 2022-12-13 | Stop reason: SDUPTHER

## 2021-04-29 RX ORDER — CIPROFLOXACIN 500 MG/1
500 TABLET ORAL EVERY 12 HOURS
Qty: 14 TABLET | Refills: 0 | Status: SHIPPED | OUTPATIENT
Start: 2021-04-29 | End: 2021-05-06

## 2021-04-29 RX ORDER — DULOXETIN HYDROCHLORIDE 60 MG/1
60 CAPSULE, DELAYED RELEASE ORAL DAILY
COMMUNITY
Start: 2021-02-27 | End: 2021-04-29 | Stop reason: SDUPTHER

## 2021-04-29 RX ORDER — BACLOFEN 10 MG/1
10 TABLET ORAL 2 TIMES DAILY
Qty: 60 TABLET | Refills: 5 | Status: SHIPPED | OUTPATIENT
Start: 2021-04-29 | End: 2022-12-13 | Stop reason: SDUPTHER

## 2021-04-29 RX ORDER — SIMVASTATIN 40 MG/1
40 TABLET, FILM COATED ORAL DAILY
Qty: 30 TABLET | Refills: 5 | Status: SHIPPED | OUTPATIENT
Start: 2021-04-29 | End: 2022-12-13 | Stop reason: SDUPTHER

## 2021-04-29 RX ORDER — IBUPROFEN 800 MG/1
800 TABLET ORAL 2 TIMES DAILY PRN
Qty: 60 TABLET | Refills: 5 | Status: SHIPPED | OUTPATIENT
Start: 2021-04-29

## 2021-04-29 RX ORDER — DULOXETIN HYDROCHLORIDE 60 MG/1
60 CAPSULE, DELAYED RELEASE ORAL DAILY
Qty: 30 CAPSULE | Refills: 5 | Status: SHIPPED | OUTPATIENT
Start: 2021-04-29 | End: 2022-12-13 | Stop reason: SDUPTHER

## 2021-04-29 RX ORDER — METOCLOPRAMIDE 10 MG/1
10 TABLET ORAL 3 TIMES DAILY PRN
Qty: 30 TABLET | Refills: 2 | Status: SHIPPED | OUTPATIENT
Start: 2021-04-29 | End: 2022-12-13

## 2021-04-29 RX ORDER — GABAPENTIN 300 MG/1
300 CAPSULE ORAL 2 TIMES DAILY
Qty: 60 CAPSULE | Refills: 5 | Status: SHIPPED | OUTPATIENT
Start: 2021-04-29

## 2021-05-01 LAB — BACTERIA UR CULT: ABNORMAL

## 2021-05-14 ENCOUNTER — TELEPHONE (OUTPATIENT)
Dept: FAMILY MEDICINE | Facility: CLINIC | Age: 56
End: 2021-05-14

## 2021-05-24 ENCOUNTER — TELEPHONE (OUTPATIENT)
Dept: FAMILY MEDICINE | Facility: CLINIC | Age: 56
End: 2021-05-24

## 2021-08-02 ENCOUNTER — TELEPHONE (OUTPATIENT)
Dept: FAMILY MEDICINE | Facility: CLINIC | Age: 56
End: 2021-08-02

## 2021-08-27 ENCOUNTER — TELEPHONE (OUTPATIENT)
Dept: FAMILY MEDICINE | Facility: CLINIC | Age: 56
End: 2021-08-27

## 2022-11-15 ENCOUNTER — TELEPHONE (OUTPATIENT)
Dept: FAMILY MEDICINE | Facility: CLINIC | Age: 57
End: 2022-11-15
Payer: MEDICAID

## 2022-11-15 NOTE — TELEPHONE ENCOUNTER
Returned call to pt,pt was see Dr Romero for Htn and cholesteral and wants to re-est.here,Scheduled appt with Ms Sylvester on 11-21-22      ----- Message from Catherine Beck, Patient Care Assistant sent at 11/15/2022 12:14 PM CST -----  Regarding: appointment  Contact: pt  Type:  Sooner Appointment Request    Caller is requesting a sooner appointment.  Caller declined first available appointment listed below.  Caller will not accept being placed on the waitlist and is requesting a message be sent to doctor.    Name of Caller:  pt   When is the first available appointment?  2023   Symptoms:  refill b/p meds   Best Call Back Number:  [phone] (home)     Additional Information:  please call pt to advise. Thanks!

## 2022-11-21 ENCOUNTER — TELEPHONE (OUTPATIENT)
Dept: FAMILY MEDICINE | Facility: CLINIC | Age: 57
End: 2022-11-21
Payer: MEDICAID

## 2022-12-05 ENCOUNTER — TELEPHONE (OUTPATIENT)
Dept: FAMILY MEDICINE | Facility: CLINIC | Age: 57
End: 2022-12-05
Payer: MEDICAID

## 2022-12-05 NOTE — TELEPHONE ENCOUNTER
----- Message from Marisa Martel sent at 12/5/2022  8:03 AM CST -----  Contact: PT  Type:  Same Day Appointment Request    Caller is requesting a same day appointment.  Caller declined first available appointment listed below.      Name of Caller:  PT  When is the first available appointment?  No solutions found  Symptoms:  Med Refills  Best Call Back Number:  [phone]  Additional Information:   PT had appt on 12/1/22 that he missed, asking if he can come in  today please give asap. Pt states he needs to know so he can line up his transportation

## 2022-12-13 ENCOUNTER — OFFICE VISIT (OUTPATIENT)
Dept: FAMILY MEDICINE | Facility: CLINIC | Age: 57
End: 2022-12-13
Payer: MEDICAID

## 2022-12-13 VITALS
WEIGHT: 174.19 LBS | TEMPERATURE: 98 F | SYSTOLIC BLOOD PRESSURE: 130 MMHG | OXYGEN SATURATION: 96 % | HEART RATE: 64 BPM | DIASTOLIC BLOOD PRESSURE: 68 MMHG | BODY MASS INDEX: 23.59 KG/M2 | HEIGHT: 72 IN

## 2022-12-13 DIAGNOSIS — E78.2 MIXED HYPERLIPIDEMIA: Primary | ICD-10-CM

## 2022-12-13 DIAGNOSIS — R74.8 ELEVATED LIVER ENZYMES: ICD-10-CM

## 2022-12-13 DIAGNOSIS — M47.812 CERVICAL SPONDYLOSIS WITHOUT MYELOPATHY: ICD-10-CM

## 2022-12-13 DIAGNOSIS — Z12.11 COLON CANCER SCREENING: ICD-10-CM

## 2022-12-13 DIAGNOSIS — I10 ESSENTIAL HYPERTENSION: ICD-10-CM

## 2022-12-13 DIAGNOSIS — G35 MULTIPLE SCLEROSIS, RELAPSING-REMITTING: ICD-10-CM

## 2022-12-13 DIAGNOSIS — Z12.5 PROSTATE CANCER SCREENING: ICD-10-CM

## 2022-12-13 DIAGNOSIS — M47.816 LUMBAR SPONDYLOSIS: ICD-10-CM

## 2022-12-13 DIAGNOSIS — R53.83 FATIGUE, UNSPECIFIED TYPE: ICD-10-CM

## 2022-12-13 PROCEDURE — 99213 OFFICE O/P EST LOW 20 MIN: CPT | Mod: ,,, | Performed by: FAMILY MEDICINE

## 2022-12-13 PROCEDURE — 99213 PR OFFICE/OUTPT VISIT, EST, LEVL III, 20-29 MIN: ICD-10-PCS | Mod: ,,, | Performed by: FAMILY MEDICINE

## 2022-12-13 PROCEDURE — 3078F DIAST BP <80 MM HG: CPT | Mod: CPTII,,, | Performed by: FAMILY MEDICINE

## 2022-12-13 PROCEDURE — 1159F PR MEDICATION LIST DOCUMENTED IN MEDICAL RECORD: ICD-10-PCS | Mod: CPTII,,, | Performed by: FAMILY MEDICINE

## 2022-12-13 PROCEDURE — 3008F BODY MASS INDEX DOCD: CPT | Mod: CPTII,,, | Performed by: FAMILY MEDICINE

## 2022-12-13 PROCEDURE — 1159F MED LIST DOCD IN RCRD: CPT | Mod: CPTII,,, | Performed by: FAMILY MEDICINE

## 2022-12-13 PROCEDURE — 3075F SYST BP GE 130 - 139MM HG: CPT | Mod: CPTII,,, | Performed by: FAMILY MEDICINE

## 2022-12-13 PROCEDURE — 3078F PR MOST RECENT DIASTOLIC BLOOD PRESSURE < 80 MM HG: ICD-10-PCS | Mod: CPTII,,, | Performed by: FAMILY MEDICINE

## 2022-12-13 PROCEDURE — 3075F PR MOST RECENT SYSTOLIC BLOOD PRESS GE 130-139MM HG: ICD-10-PCS | Mod: CPTII,,, | Performed by: FAMILY MEDICINE

## 2022-12-13 PROCEDURE — 3008F PR BODY MASS INDEX (BMI) DOCUMENTED: ICD-10-PCS | Mod: CPTII,,, | Performed by: FAMILY MEDICINE

## 2022-12-13 RX ORDER — HYDROCHLOROTHIAZIDE 25 MG/1
25 TABLET ORAL DAILY
Qty: 30 TABLET | Refills: 5 | Status: SHIPPED | OUTPATIENT
Start: 2022-12-13

## 2022-12-13 RX ORDER — DULOXETIN HYDROCHLORIDE 60 MG/1
60 CAPSULE, DELAYED RELEASE ORAL DAILY
Qty: 30 CAPSULE | Refills: 5 | Status: SHIPPED | OUTPATIENT
Start: 2022-12-13

## 2022-12-13 RX ORDER — HYDROCODONE BITARTRATE AND ACETAMINOPHEN 10; 325 MG/1; MG/1
TABLET ORAL
COMMUNITY
Start: 2022-11-30

## 2022-12-13 RX ORDER — SIMVASTATIN 40 MG/1
40 TABLET, FILM COATED ORAL DAILY
Qty: 30 TABLET | Refills: 5 | Status: SHIPPED | OUTPATIENT
Start: 2022-12-13

## 2022-12-13 RX ORDER — METHYLPREDNISOLONE SODIUM SUCCINATE 500 MG/4ML
INJECTION, POWDER, FOR SOLUTION INTRAMUSCULAR; INTRAVENOUS
COMMUNITY

## 2022-12-13 RX ORDER — BACLOFEN 10 MG/1
10 TABLET ORAL 2 TIMES DAILY
Qty: 60 TABLET | Refills: 5 | Status: SHIPPED | OUTPATIENT
Start: 2022-12-13

## 2022-12-13 NOTE — PROGRESS NOTES
Subjective:       Patient ID: Yuriy Wilks is a 57 y.o. male.    Chief Complaint: Establish Care and Medication Refill    HPI  Review of Systems    Patient Active Problem List   Diagnosis    Intractable hiccups    Pyelonephritis    Cervical spondylosis without myelopathy    Chronic back pain    Essential hypertension    Hyperlipidemia    Lumbar spondylosis    Multiple sclerosis, relapsing-remitting    Spinal stenosis, lumbar       Objective:      Physical Exam  Vitals and nursing note reviewed.   Constitutional:       Appearance: Normal appearance. He is normal weight.   HENT:      Head: Normocephalic.      Nose: Nose normal.   Eyes:      Extraocular Movements: Extraocular movements intact.      Conjunctiva/sclera: Conjunctivae normal.      Pupils: Pupils are equal, round, and reactive to light.   Cardiovascular:      Rate and Rhythm: Normal rate and regular rhythm.      Heart sounds: Normal heart sounds. No murmur heard.  Pulmonary:      Effort: Pulmonary effort is normal. No respiratory distress.      Breath sounds: Normal breath sounds.   Abdominal:      Palpations: Abdomen is soft.   Musculoskeletal:         General: Normal range of motion.      Cervical back: Normal range of motion and neck supple.      Comments: Decreased ROM, walks with cane due to MS   Skin:     General: Skin is warm and dry.   Neurological:      General: No focal deficit present.      Mental Status: He is alert and oriented to person, place, and time.   Psychiatric:         Mood and Affect: Mood normal.         Behavior: Behavior normal.       Lab Results   Component Value Date    WBC 7.75 03/26/2020    HGB 15.0 03/26/2020    HCT 43.7 03/26/2020     03/26/2020    CHOL 197 04/29/2021    TRIG 52 04/29/2021    HDL 85 (H) 04/29/2021    ALT 80 (H) 04/29/2021    AST 45 (H) 04/29/2021     04/29/2021    K 3.6 04/29/2021     04/29/2021    CREATININE 1.2 04/29/2021    BUN 23 (H) 04/29/2021    CO2 29 04/29/2021    INR 1.1  11/15/2019     The 10-year ASCVD risk score (Eriberto PAPPAS, et al., 2019) is: 18.1%    Values used to calculate the score:      Age: 57 years      Sex: Male      Is Non- : Yes      Diabetic: No      Tobacco smoker: Yes      Systolic Blood Pressure: 130 mmHg      Is BP treated: Yes      HDL Cholesterol: 85 mg/dL      Total Cholesterol: 197 mg/dL  Visit Vitals  /68 (BP Location: Right arm, Patient Position: Sitting, BP Method: Large (Manual))   Pulse 64   Temp 97.7 °F (36.5 °C) (Oral)   Ht 6' (1.829 m)   Wt 79 kg (174 lb 2.6 oz)   SpO2 96%   BMI 23.62 kg/m²      Assessment:       1. Mixed hyperlipidemia    2. Multiple sclerosis, relapsing-remitting    3. Essential hypertension    4. Colon cancer screening    5. Elevated liver enzymes    6. Prostate cancer screening    7. Fatigue, unspecified type    8. Cervical spondylosis without myelopathy    9. Lumbar spondylosis          Plan:       1. Mixed hyperlipidemia  -     Lipid Panel; Future; Expected date: 12/13/2022  -     simvastatin (ZOCOR) 40 MG tablet; Take 1 tablet (40 mg total) by mouth once daily.  Dispense: 30 tablet; Refill: 5    2. Multiple sclerosis, relapsing-remitting  Assessment & Plan:  Follow up with neurologist ASAP as he is out of his tecfidera    Orders:  -     DULoxetine (CYMBALTA) 60 MG capsule; Take 1 capsule (60 mg total) by mouth once daily.  Dispense: 30 capsule; Refill: 5  -     baclofen (LIORESAL) 10 MG tablet; Take 1 tablet (10 mg total) by mouth 2 (two) times daily.  Dispense: 60 tablet; Refill: 5    3. Essential hypertension  -     Microalbumin/Creatinine Ratio, Urine; Future; Expected date: 12/13/2022  -     hydroCHLOROthiazide (HYDRODIURIL) 25 MG tablet; Take 1 tablet (25 mg total) by mouth once daily.  Dispense: 30 tablet; Refill: 5    4. Colon cancer screening    5. Elevated liver enzymes  -     Comprehensive Metabolic Panel; Future; Expected date: 12/13/2022    6. Prostate cancer screening  -     PSA,  Screening; Future; Expected date: 12/13/2022    7. Fatigue, unspecified type  -     TSH; Future; Expected date: 12/13/2022  -     CBC Auto Differential; Future; Expected date: 12/13/2022    8. Cervical spondylosis without myelopathy  -     DULoxetine (CYMBALTA) 60 MG capsule; Take 1 capsule (60 mg total) by mouth once daily.  Dispense: 30 capsule; Refill: 5  -     baclofen (LIORESAL) 10 MG tablet; Take 1 tablet (10 mg total) by mouth 2 (two) times daily.  Dispense: 60 tablet; Refill: 5    9. Lumbar spondylosis  -     DULoxetine (CYMBALTA) 60 MG capsule; Take 1 capsule (60 mg total) by mouth once daily.  Dispense: 30 capsule; Refill: 5  -     baclofen (LIORESAL) 10 MG tablet; Take 1 tablet (10 mg total) by mouth 2 (two) times daily.  Dispense: 60 tablet; Refill: 5       Follow up in about 6 months (around 6/13/2023), or if symptoms worsen or fail to improve.

## 2022-12-19 ENCOUNTER — LAB VISIT (OUTPATIENT)
Dept: LAB | Facility: CLINIC | Age: 57
End: 2022-12-19
Payer: MEDICAID

## 2022-12-19 DIAGNOSIS — I10 ESSENTIAL HYPERTENSION: ICD-10-CM

## 2022-12-19 DIAGNOSIS — R74.8 ELEVATED LIVER ENZYMES: ICD-10-CM

## 2022-12-19 DIAGNOSIS — E78.2 MIXED HYPERLIPIDEMIA: ICD-10-CM

## 2022-12-19 DIAGNOSIS — Z12.5 PROSTATE CANCER SCREENING: ICD-10-CM

## 2022-12-19 DIAGNOSIS — R53.83 FATIGUE, UNSPECIFIED TYPE: ICD-10-CM

## 2022-12-19 LAB
ALBUMIN SERPL BCP-MCNC: 3.6 G/DL (ref 3.5–5.2)
ALP SERPL-CCNC: 67 U/L (ref 55–135)
ALT SERPL W/O P-5'-P-CCNC: 16 U/L (ref 10–44)
ANION GAP SERPL CALC-SCNC: 11 MMOL/L (ref 8–16)
AST SERPL-CCNC: 18 U/L (ref 10–40)
BASOPHILS # BLD AUTO: 0.02 K/UL (ref 0–0.2)
BASOPHILS NFR BLD: 0.3 % (ref 0–1.9)
BILIRUB SERPL-MCNC: 0.9 MG/DL (ref 0.1–1)
BUN SERPL-MCNC: 12 MG/DL (ref 6–20)
CALCIUM SERPL-MCNC: 9.3 MG/DL (ref 8.7–10.5)
CHLORIDE SERPL-SCNC: 105 MMOL/L (ref 95–110)
CHOLEST SERPL-MCNC: 218 MG/DL (ref 120–199)
CHOLEST/HDLC SERPL: 3.2 {RATIO} (ref 2–5)
CO2 SERPL-SCNC: 25 MMOL/L (ref 23–29)
COMPLEXED PSA SERPL-MCNC: 1.2 NG/ML (ref 0–4)
CREAT SERPL-MCNC: 1 MG/DL (ref 0.5–1.4)
DIFFERENTIAL METHOD: ABNORMAL
EOSINOPHIL # BLD AUTO: 0.1 K/UL (ref 0–0.5)
EOSINOPHIL NFR BLD: 0.7 % (ref 0–8)
ERYTHROCYTE [DISTWIDTH] IN BLOOD BY AUTOMATED COUNT: 14.8 % (ref 11.5–14.5)
EST. GFR  (NO RACE VARIABLE): >60 ML/MIN/1.73 M^2
GLUCOSE SERPL-MCNC: 91 MG/DL (ref 70–110)
HCT VFR BLD AUTO: 39.5 % (ref 40–54)
HDLC SERPL-MCNC: 68 MG/DL (ref 40–75)
HDLC SERPL: 31.2 % (ref 20–50)
HGB BLD-MCNC: 13.3 G/DL (ref 14–18)
IMM GRANULOCYTES # BLD AUTO: 0.02 K/UL (ref 0–0.04)
IMM GRANULOCYTES NFR BLD AUTO: 0.3 % (ref 0–0.5)
LDLC SERPL CALC-MCNC: 141 MG/DL (ref 63–159)
LYMPHOCYTES # BLD AUTO: 1.7 K/UL (ref 1–4.8)
LYMPHOCYTES NFR BLD: 23.3 % (ref 18–48)
MCH RBC QN AUTO: 31.9 PG (ref 27–31)
MCHC RBC AUTO-ENTMCNC: 33.7 G/DL (ref 32–36)
MCV RBC AUTO: 95 FL (ref 82–98)
MONOCYTES # BLD AUTO: 0.7 K/UL (ref 0.3–1)
MONOCYTES NFR BLD: 10 % (ref 4–15)
NEUTROPHILS # BLD AUTO: 4.8 K/UL (ref 1.8–7.7)
NEUTROPHILS NFR BLD: 65.4 % (ref 38–73)
NONHDLC SERPL-MCNC: 150 MG/DL
NRBC BLD-RTO: 0 /100 WBC
PLATELET # BLD AUTO: 202 K/UL (ref 150–450)
PMV BLD AUTO: 10.7 FL (ref 9.2–12.9)
POTASSIUM SERPL-SCNC: 4.3 MMOL/L (ref 3.5–5.1)
PROT SERPL-MCNC: 8.2 G/DL (ref 6–8.4)
RBC # BLD AUTO: 4.17 M/UL (ref 4.6–6.2)
SODIUM SERPL-SCNC: 141 MMOL/L (ref 136–145)
TRIGL SERPL-MCNC: 45 MG/DL (ref 30–150)
TSH SERPL DL<=0.005 MIU/L-ACNC: 0.54 UIU/ML (ref 0.4–4)
WBC # BLD AUTO: 7.3 K/UL (ref 3.9–12.7)

## 2022-12-19 PROCEDURE — 82043 UR ALBUMIN QUANTITATIVE: CPT | Performed by: FAMILY MEDICINE

## 2022-12-19 PROCEDURE — 85025 COMPLETE CBC W/AUTO DIFF WBC: CPT | Performed by: FAMILY MEDICINE

## 2022-12-19 PROCEDURE — 84153 ASSAY OF PSA TOTAL: CPT | Performed by: FAMILY MEDICINE

## 2022-12-19 PROCEDURE — 84443 ASSAY THYROID STIM HORMONE: CPT | Performed by: FAMILY MEDICINE

## 2022-12-19 PROCEDURE — 82570 ASSAY OF URINE CREATININE: CPT | Performed by: FAMILY MEDICINE

## 2022-12-19 PROCEDURE — 80061 LIPID PANEL: CPT | Performed by: FAMILY MEDICINE

## 2022-12-19 PROCEDURE — 80053 COMPREHEN METABOLIC PANEL: CPT | Performed by: FAMILY MEDICINE

## 2022-12-20 LAB
ALBUMIN/CREAT UR: 22.2 UG/MG (ref 0–30)
CREAT UR-MCNC: 180 MG/DL (ref 23–375)
MICROALBUMIN UR DL<=1MG/L-MCNC: 40 UG/ML

## 2022-12-21 ENCOUNTER — TELEPHONE (OUTPATIENT)
Dept: FAMILY MEDICINE | Facility: CLINIC | Age: 57
End: 2022-12-21
Payer: MEDICAID

## 2024-04-10 DIAGNOSIS — I10 ESSENTIAL HYPERTENSION: ICD-10-CM
